# Patient Record
Sex: FEMALE | Race: OTHER | HISPANIC OR LATINO | Employment: FULL TIME | URBAN - METROPOLITAN AREA
[De-identification: names, ages, dates, MRNs, and addresses within clinical notes are randomized per-mention and may not be internally consistent; named-entity substitution may affect disease eponyms.]

---
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
## 2018-01-10 NOTE — MISCELLANEOUS
Provider Comments  Provider Comments:   PN PT WAS A NO SHOW AGAIN TODAY I CALLED AND LEFT MSG FOR PT TO CALL AND RESCHEDULE APPT      Signatures   Electronically signed by : Regina Correa, ; Mar 10 2016  9:30AM EST                       (Author)    Electronically signed by :  YANIRA Lawler; Mar 21 2016 11:16AM EST                       (Acknowledgement)

## 2018-01-10 NOTE — MISCELLANEOUS
Provider Comments  Provider Comments:   PT NO SHOW FOR PN TRY CALLING PATIENT TO R/S NOT ACCEPTING CALLS GOING TO SEND LETTER      Signatures   Electronically signed by : Corinda Fothergill, ; Mar  9 2016 11:15AM EST                       (Author)    Electronically signed by :  YANIRA Siddiqui; Mar  9 2016  2:44PM EST                       (Acknowledgement)    Electronically signed by : Nichole Kinney MD; Mar 10 2016  9:11AM EST

## 2018-01-14 NOTE — MISCELLANEOUS
Reason For Visit  Reason For Visit Free Text Note Form: Teenage pregnancy  Case Management Documentation [facility]:   Information obtained from the patient  She is also dealing with additional issues such as lack of support and family crisis  Patient is participating in [facility]  Action Plan: supportive counseling/advocacy and information provided  plan reviewed  Progress Note  Met with pt  with her friend present at pts  request  Pt  admits she is pregnant  She is feeling excited and scared at the same time  Pt  reports this was planned by FOB however he has changed his mind and does not want to be involved  Pt  reports residing with her mother  She is attending high school and plans to graduate in June 2016  Pt  reports she is afraid to tell her mother she is pregnant  Supportive counseling provided  Offered pt  outpatient mental health counseling option however she declines  Reminded pt  that school may provide counseling services and encouraged her to pursue this as needed  Pt  reports she has the support of her friends at this time  She denies further needs  Encouraged pt  to contact SW at [facility] as needed for support and assistance  She verbalizes understanding and agreement of information  Will continue to be available to provide support  Active Problems    1  Cough (786 2) (R05)   2  Other normal pregnancy, not first (V22 1) (Z34 80)   3  Pharyngitis (462) (J02 9)   4  Skin growth (239 2) (D49 2)    Current Meds   1  No Reported Medications Recorded    Allergies    1  No Known Drug Allergies    2   Seasonal    Signatures   Electronically signed by : LUIS ALBERTO Lee; Jan 21 2016  2:06PM EST                       (Author)

## 2018-02-16 ENCOUNTER — HOSPITAL ENCOUNTER (EMERGENCY)
Facility: HOSPITAL | Age: 18
Discharge: HOME | End: 2018-02-16
Attending: EMERGENCY MEDICINE | Admitting: EMERGENCY MEDICINE
Payer: COMMERCIAL

## 2018-02-16 VITALS
BODY MASS INDEX: 28.34 KG/M2 | OXYGEN SATURATION: 99 % | TEMPERATURE: 98.7 F | HEART RATE: 98 BPM | SYSTOLIC BLOOD PRESSURE: 104 MMHG | DIASTOLIC BLOOD PRESSURE: 55 MMHG | RESPIRATION RATE: 18 BRPM | WEIGHT: 150 LBS

## 2018-02-16 DIAGNOSIS — J06.9 UPPER RESPIRATORY INFECTION: Primary | ICD-10-CM

## 2018-02-16 DIAGNOSIS — Z3A.24 24 WEEKS GESTATION OF PREGNANCY: ICD-10-CM

## 2018-02-16 PROCEDURE — 87798 DETECT AGENT NOS DNA AMP: CPT | Performed by: EMERGENCY MEDICINE

## 2018-02-16 PROCEDURE — 99283 EMERGENCY DEPT VISIT LOW MDM: CPT

## 2018-02-17 LAB
FLUAV AG SPEC QL: DETECTED
FLUBV AG SPEC QL: ABNORMAL
RSV B RNA SPEC QL NAA+PROBE: ABNORMAL

## 2018-02-17 NOTE — ED PROVIDER NOTES
History  Chief Complaint   Patient presents with    URI     Pt  is co sore throat, hot and cold flashes and headaches that started this morning  Her boyfriend was recently diagnosed with the flu and pt  is 24 weeks pregnant and is concerned about having the flu because of her pregnancy  78-year-old female currently 24 weeks pregnant presents to the emergency room with sore throat, cough, and congestion x1 day  Patient reports that her boyfriend was diagnosed with the flu 2 days ago  She states that she was given Tamiflu q day times 10 days for prophylaxis  She denies any fevers, nausea/vomiting, chest pain, shortness of breath, abdominal pain, or urinary symptoms  She reports that her pregnancy has been uncomplicated thus far and denies any abdominal pain or cramping  She also states that she has been feeling normal fetal movements  She denies any vaginal bleeding or discharge  No other complaints  History provided by:  Patient  Cough   Cough characteristics:  Non-productive  Severity:  Moderate  Onset quality:  Sudden  Duration:  1 day  Timing:  Constant  Progression:  Worsening  Chronicity:  New  Smoker: no    Context: sick contacts    Relieved by:  None tried  Worsened by:  Nothing  Ineffective treatments:  None tried  Associated symptoms: sore throat    Associated symptoms: no chest pain, no chills, no ear pain, no fever, no headaches, no rash, no rhinorrhea, no shortness of breath and no wheezing    Sore throat:     Duration:  1 day    Timing:  Constant    Progression:  Worsening      Prior to Admission Medications   Prescriptions Last Dose Informant Patient Reported? Taking?    Prenatal Vit-Fe Fumarate-FA (PRENATAL 1 PLUS 1 PO)   Yes Yes   Sig: Take 1 tablet by mouth daily      Facility-Administered Medications: None       Past Medical History:   Diagnosis Date    Asthma     Thrombocytosis ([facility] [address] )        Past Surgical History:   Procedure Laterality Date    APPENDECTOMY         History reviewed  No pertinent family history  I have reviewed and agree with the history as documented  Social History   Substance Use Topics    Smoking status: Never Smoker    Smokeless tobacco: Never Used    Alcohol use No        Review of Systems   Constitutional: Negative for chills and fever  HENT: Positive for sore throat  Negative for congestion, ear pain and rhinorrhea  Eyes: Negative for pain and visual disturbance  Respiratory: Positive for cough  Negative for shortness of breath and wheezing  Cardiovascular: Negative for chest pain and leg swelling  Gastrointestinal: Negative for abdominal pain, diarrhea, nausea and vomiting  Genitourinary: Negative for dysuria, frequency, hematuria and urgency  Musculoskeletal: Negative for neck pain and neck stiffness  Skin: Negative for rash and wound  Neurological: Negative for weakness, numbness and headaches  Psychiatric/Behavioral: Negative for agitation and confusion  All other systems reviewed and are negative  Physical Exam  ED Triage Vitals [02/16/18 1926]   Temperature Pulse Respirations Blood Pressure SpO2   98 7 °F (37 1 °C) 98 18 104/55 99 %      Temp Source Heart Rate Source Patient Position - Orthostatic VS BP Location FiO2 (%)   Oral Monitor Sitting Left arm --      Pain Score       8           Orthostatic Vital Signs  Vitals:    02/16/18 1926   BP: 104/55   Pulse: 98   Patient Position - Orthostatic VS: Sitting       Physical Exam   Constitutional: She is oriented to person, place, and time  She appears well-developed and well-nourished  HENT:   Head: Normocephalic and atraumatic  Nose: Mucosal edema present  Mouth/Throat: Uvula is midline and mucous membranes are normal  Posterior oropharyngeal erythema present  No oropharyngeal exudate  Eyes: EOM are normal  Pupils are equal, round, and reactive to light  Neck: Normal range of motion  Neck supple  Cardiovascular: Normal rate and regular rhythm  Pulmonary/Chest: Effort normal and breath sounds normal    Abdominal: Soft  Bowel sounds are normal  She exhibits no distension  There is no tenderness  Gravid uterus around 24 weeks   Musculoskeletal: Normal range of motion  Neurological: She is alert and oriented to person, place, and time  No focal deficits   Skin: Skin is warm and dry  Nursing note and vitals reviewed  ED Medications  Medications - No data to display    Diagnostic Studies  Results Reviewed     Procedure Component Value Units Date/Time    Influenza A/B and RSV by PCR (indicated for patients >2 mo of age) [[id number]] Collected:  02/16/18 2150    Lab Status: In process Specimen:  Nasopharyngeal from Nasopharyngeal Swab Updated:  02/16/18 2155                 No orders to display         Procedures  Procedures      Phone Consults  ED Phone Contact    ED Course  ED Course                                MDM  Number of Diagnoses or Management Options  24 weeks gestation of pregnancy: new and requires workup  Upper respiratory infection: new and requires workup  Diagnosis management comments: Patient with sore throat, cough, and congestion likely secondary to viral URI  Will get flu swab to rule out flu  Plan is for supportive care including Tylenol, fluids, and rest   Patient instructed to continue Tamiflu prophylaxis and if she is called that she is positive for the flu then she is to increase the dose to twice a day for 5 days  Patient reevaluated and feels improved  Discharge instructions given including follow-up, and return precautions  Patient demonstrates verbal understanding and agrees with plan         Amount and/or Complexity of Data Reviewed  Clinical lab tests: ordered and reviewed  Tests in the radiology section of CPT®: ordered and reviewed  Tests in the medicine section of CPT®: ordered and reviewed  Discussion of test results with the performing providers: yes  Decide to obtain previous medical records or to obtain history from someone other than the patient: yes  Obtain history from someone other than the patient: yes  Review and summarize past medical records: yes  Discuss the patient with other providers: yes  Independent visualization of images, tracings, or specimens: yes    Patient Progress  Patient progress: improved    CritCare Time    Disposition  Final diagnoses:   Upper respiratory infection   24 weeks gestation of pregnancy     Time reflects when diagnosis was documented in both MDM as applicable and the Disposition within this note     Time User Action Codes Description Comment    2/16/2018  9:45 PM Reina Man Add [J06 9] Upper respiratory infection     2/16/2018  9:46 PM Reina Man Add [Z3A 24] 24 weeks gestation of pregnancy       ED Disposition     ED Disposition Condition Comment    Discharge  Perez Fleming discharge to home/self care  Condition at discharge: Good        Follow-up Information     Follow up With Specialties Details Why Contact Info Additional Information    Your OBGYN  Call in 1 day For follow-up within 2-3 days  [address] [place] [address] Emergency Department Emergency Medicine Go to Immediately for any new or worsening symptoms  [address] [place] [address]  [phone]  ED, [address] [place] [address], [id number]        Discharge Medication List as of 2/16/2018  9:47 PM      CONTINUE these medications which have NOT CHANGED    Details   Prenatal Vit-Fe Fumarate-FA (PRENATAL 1 PLUS 1 PO) Take 1 tablet by mouth daily, Historical Med           No discharge procedures on file  ED Provider  Attending physically available and evaluated Perez Fleming I managed the patient along with the ED Attending      Electronically Signed by         Beth Schuler DO  02/16/18 8761

## 2018-02-17 NOTE — DISCHARGE INSTRUCTIONS
Upper Respiratory Infection   WHAT YOU NEED TO KNOW:   An upper respiratory infection is also called the common cold  It is an infection that can affect your nose, throat, ears, and sinuses  For healthy people, the common cold is usually not serious and does not need special treatment  Cold symptoms are usually worst for the first 3 to 5 days  Most people get better in 7 to 14 days  You may continue to cough for 2 to 3 weeks  Colds are caused by viruses and do not get better with antibiotics  DISCHARGE INSTRUCTIONS:   Return to the emergency department if:   · You have chest pain or trouble breathing  Contact your healthcare provider if:   · You have a fever over 102ºF (39°C)  · Your sore throat gets worse or you see white or yellow spots in your throat  · Your symptoms get worse after 3 to 5 days or your cold is not better in 14 days  · You have a rash anywhere on your skin  · You have large, tender lumps in your neck  · You have thick, green or yellow drainage from your nose  · You cough up thick yellow, green, or bloody mucus  · You have vomiting for more than 24 hours and cannot keep fluids down  · You have a bad earache  · You have questions or concerns about your condition or care  Medicines: You may need any of the following:  · Decongestants  help reduce nasal congestion and help you breathe more easily  If you take decongestant pills, they may make you feel restless or cause problems with your sleep  Do not use decongestant sprays for more than a few days  · Cough suppressants  help reduce coughing  Ask your healthcare provider which type of cough medicine is best for you  · NSAIDs , such as ibuprofen, help decrease swelling, pain, and fever  NSAIDs can cause stomach bleeding or kidney problems in certain people  If you take blood thinner medicine, always ask your healthcare provider if NSAIDs are safe for you   Always read the medicine label and follow directions  · Acetaminophen  decreases pain and fever  It is available without a doctor's order  Ask how much to take and how often to take it  Follow directions  Read the labels of all other medicines you are using to see if they also contain acetaminophen, or ask your doctor or pharmacist  Acetaminophen can cause liver damage if not taken correctly  Do not use more than 4 grams (4,000 milligrams) total of acetaminophen in one day  · Take your medicine as directed  Contact your healthcare provider if you think your medicine is not helping or if you have side effects  Tell him or her if you are allergic to any medicine  Keep a list of the medicines, vitamins, and herbs you take  Include the amounts, and when and why you take them  Bring the list or the pill bottles to follow-up visits  Carry your medicine list with you in case of an emergency  Follow up with your healthcare provider as directed:  Write down your questions so you remember to ask them during your visits  Self-care:   · Rest as much as possible  Slowly start to do more each day  · Drink more liquids as directed  Liquids will help thin and loosen mucus so you can cough it up  Liquids will also help prevent dehydration  Liquids that help prevent dehydration include water, fruit juice, and broth  Do not drink liquids that contain caffeine  Caffeine can increase your risk for dehydration  Ask your healthcare provider how much liquid to drink each day  · Soothe a sore throat  Gargle with warm salt water  This helps your sore throat feel better  Make salt water by dissolving ¼ teaspoon salt in 1 cup warm water  You may also suck on hard candy or throat lozenges  You may use a sore throat spray  · Use a humidifier or vaporizer  Use a cool mist humidifier or a vaporizer to increase air moisture in your home  This may make it easier for you to breathe and help decrease your cough  · Use saline nasal drops as directed    These help relieve congestion  · Apply petroleum-based jelly around the outside of your nostrils  This can decrease irritation from blowing your nose  · Do not smoke  Nicotine and other chemicals in cigarettes and cigars can make your symptoms worse  They can also cause infections such as bronchitis or pneumonia  Ask your healthcare provider for information if you currently smoke and need help to quit  E-cigarettes or smokeless tobacco still contain nicotine  Talk to your healthcare provider before you use these products  Prevent spreading your cold to others:   · Try to stay away from other people during the first 2 to 3 days of your cold when it is more easily spread  · Do not share food or drinks  · Do not share hand towels with household members  · Wash your hands often, especially after you blow your nose  Turn away from other people and cover your mouth and nose with a tissue when you sneeze or cough  © 2017 [address] [employer]  Information is for End User's use only and may not be sold, redistributed or otherwise used for commercial purposes  All illustrations and images included in CareNotes® are the copyrighted property of [employer]  or [employer]ack Mcneal  The above information is an  only  It is not intended as medical advice for individual conditions or treatments  Talk to your doctor, nurse or pharmacist before following any medical regimen to see if it is safe and effective for you  Pregnancy at 23 to 26 [address]:   You are now close to or at the beginning of the third trimester  The third trimester starts at 24 weeks and ends with delivery  As your baby gets larger, you may develop certain symptoms  These may include pain in your back or down the sides of your abdomen  You may also have stretch marks on your abdomen, breasts, thighs, or buttocks  You may also have constipation    DISCHARGE INSTRUCTIONS:   Return to the emergency department if:   · You develop a severe headache that does not go away  · You have new or increased vision changes, such as blurred or spotted vision  · You have new or increased swelling in your face or hands  · You have vaginal spotting or bleeding  · Your water broke or you feel warm water gushing or trickling from your vagina  Contact your healthcare provider if:   · You have abdominal cramps, pressure, or tightening  · You have a change in vaginal discharge  · You have light bleeding  · You have chills or a fever  · You have vaginal itching, burning, or pain  · You have yellow, green, white, or foul-smelling vaginal discharge  · You have pain or burning when you urinate, less urine than usual, or pink or bloody urine  · You have questions or concerns about your condition or care  How to care for yourself at this stage of your pregnancy:   · Eat a variety of healthy foods  Healthy foods include fruits, vegetables, whole-grain breads, low-fat dairy foods, beans, lean meats, and fish  Drink liquids as directed  Ask how much liquid to drink each day and which liquids are best for you  Limit caffeine to less than 200 milligrams each day  Limit your intake of fish to 2 servings each week  Choose fish low in mercury such as canned light tuna, shrimp, salmon, cod, or tilapia  Do not  eat fish high in mercury such as swordfish, tilefish, momo mackerel, and shark  · Manage back pain  Do not stand for long periods of time or lift heavy items  Use good posture while you stand, squat, or bend  Wear low-heeled shoes with good support  Rest may also help to relieve back pain  Ask your healthcare provider about exercises you can do to strengthen your back muscles  · Take prenatal vitamins as directed  Your need for certain vitamins and minerals, such as folic acid, increases during pregnancy  Prenatal vitamins provide some of the extra vitamins and minerals you need   Prenatal vitamins may also help to decrease the risk of certain birth defects  · Talk to your healthcare provider about exercise  Moderate exercise can help you stay fit  Your healthcare provider will help you plan an exercise program that is safe for you during pregnancy  · Do not smoke  If you smoke, it is never too late to quit  Smoking increases your risk of a miscarriage and other health problems during your pregnancy  Smoking can cause your baby to be born too early or weigh less at birth  Ask your healthcare provider for information if you need help quitting  · Do not drink alcohol  Alcohol passes from your body to your baby through the placenta  It can affect your baby's brain development and cause fetal alcohol syndrome (FAS)  FAS is a group of conditions that causes mental, behavior, and growth problems  · Talk to your healthcare provider before you take any medicines  Many medicines may harm your baby if you take them when you are pregnant  Do not take any medicines, vitamins, herbs, or supplements without first talking to your healthcare provider  Never use illegal or street drugs (such as marijuana or cocaine) while you are pregnant  Safety tips:   · Avoid hot tubs and saunas  Do not use a hot tub or sauna while you are pregnant, especially during your first trimester  Hot tubs and saunas may raise your baby's temperature and increase the risk of birth defects  · Avoid toxoplasmosis  This is an infection caused by eating raw meat or being around infected cat feces  It can cause birth defects, miscarriages, and other problems  Wash your hands after you touch raw meat  Make sure any meat is well-cooked before you eat it  Avoid raw eggs and unpasteurized milk  Use gloves or ask someone else to clean your cat's litter box while you are pregnant  Changes that are happening with your baby:  By 26 weeks, your baby will weigh about 2 pounds   Your baby will be about 10 inches long from the top of the head to the rump (baby's bottom)  Your baby's movements are much stronger now  Your baby's eyes are almost completely formed and can partially open  Your baby also sleeps and wakes up  What you need to know about prenatal care: Your healthcare provider will check your blood pressure and weight  You may also need the following:  · A urine test  may also be done to check for sugar and protein  These can be signs of gestational diabetes or infection  Protein in your urine may also be a sign of preeclampsia  Preeclampsia is a condition that can develop during week 20 or later of your pregnancy  It causes high blood pressure, and it can cause problems with your kidneys and other organs  · Fundal height  is a measurement of your uterus to check your baby's growth  This number is usually the same as the number of weeks that you have been pregnant  · Your baby's heart rate  will be checked  © 2017 [address] [employer] Information is for End User's use only and may not be sold, redistributed or otherwise used for commercial purposes  All illustrations and images included in CareNotes® are the copyrighted property of A D A M [employer]  or Zack Mcneal  The above information is an  only  It is not intended as medical advice for individual conditions or treatments  Talk to your doctor, nurse or pharmacist before following any medical regimen to see if it is safe and effective for you

## 2018-02-18 NOTE — PROGRESS NOTES
Patient called ED for a positive flu she was informed of yesterday when she called  She was previously given tamiflu for exposure prophyllaxis  However, since her visit she has been taking the therapeutic dose of 75mg BID  I instructed her to complete the 75mg BID for 5 days

## 2018-02-27 NOTE — ED ATTENDING ATTESTATION
Lieutenant Kiesha MD, saw and evaluated the patient  I have discussed the patient with the resident/non-physician practitioner and agree with the resident's/non-physician practitioner's findings, Plan of Care, and MDM as documented in the resident's/non-physician practitioner's note, except where noted  All available labs and Radiology studies were reviewed  At this point I agree with the current assessment done in the Emergency Department  I have conducted an independent evaluation of this patient a history and physical is as follows:    Patient presents with 1 day of cough, sore throat, and congestion  Reports that her boyfriend was dx with influenza and patient was started on prophylactic Tamiflu  Patient is 24 weeks pregnant and concerned robbie that may have the flu  No concerns with the pregnancy  Exam: AAOx3, NAD, RRR, CTA, S/NT/ND  A/P: Viral URI  Will check flu to adjust dosing of tamiflu      Critical Care Time  CritCare Time    Procedures

## 2019-12-30 ENCOUNTER — HOSPITAL ENCOUNTER (EMERGENCY)
Facility: HOSPITAL | Age: 19
Discharge: HOME | End: 2019-12-30
Attending: EMERGENCY MEDICINE | Admitting: EMERGENCY MEDICINE
Payer: COMMERCIAL

## 2019-12-30 VITALS
SYSTOLIC BLOOD PRESSURE: 108 MMHG | OXYGEN SATURATION: 100 % | DIASTOLIC BLOOD PRESSURE: 65 MMHG | HEART RATE: 113 BPM | BODY MASS INDEX: 23.24 KG/M2 | TEMPERATURE: 98.3 F | RESPIRATION RATE: 16 BRPM | WEIGHT: 123 LBS

## 2019-12-30 DIAGNOSIS — B34.9 VIRAL SYNDROME: Primary | ICD-10-CM

## 2019-12-30 LAB
EXT PREG TEST URINE: NEGATIVE
EXT. CONTROL ED NAV: NORMAL
FLUAV RNA NPH QL NAA+PROBE: NORMAL
FLUBV RNA NPH QL NAA+PROBE: NORMAL
RSV RNA NPH QL NAA+PROBE: NORMAL

## 2019-12-30 PROCEDURE — 99284 EMERGENCY DEPT VISIT MOD MDM: CPT

## 2019-12-30 PROCEDURE — 87631 RESP VIRUS 3-5 TARGETS: CPT | Performed by: EMERGENCY MEDICINE

## 2019-12-30 PROCEDURE — 81025 URINE PREGNANCY TEST: CPT | Performed by: EMERGENCY MEDICINE

## 2019-12-30 PROCEDURE — 96372 THER/PROPH/DIAG INJ SC/IM: CPT

## 2019-12-30 PROCEDURE — 99284 EMERGENCY DEPT VISIT MOD MDM: CPT | Performed by: EMERGENCY MEDICINE

## 2019-12-30 RX ORDER — ONDANSETRON 4 MG/1
4 TABLET, ORALLY DISINTEGRATING ORAL EVERY 6 HOURS PRN
Qty: 20 TABLET | Refills: 0 | Status: ON HOLD | OUTPATIENT
Start: 2019-12-30 | End: 2020-01-04 | Stop reason: ALTCHOICE

## 2019-12-30 RX ORDER — KETOROLAC TROMETHAMINE 30 MG/ML
30 INJECTION, SOLUTION INTRAMUSCULAR; INTRAVENOUS ONCE
Status: COMPLETED | OUTPATIENT
Start: 2019-12-30 | End: 2019-12-30

## 2019-12-30 RX ADMIN — KETOROLAC TROMETHAMINE 30 MG: 30 INJECTION, SOLUTION INTRAMUSCULAR at 19:47

## 2019-12-31 NOTE — ED PROVIDER NOTES
History  Chief Complaint   Patient presents with    Jaw Pain     pt states her lower jaw is swollen  pt reports waking up at night with sweats  pt states she has generalized body aches  tylenol taken early this morning     Patient is a 70-year-old female complaining of a 2 day history of swollen glands body aches nausea as well as subjective fever and chills  Patient not taking medication for symptoms  States she did get her flu shot this year  Denies any sick contacts  No vomiting no diarrhea  No difficulty swallowing  States was told approximately 1 year ago patient was post get her wisdom teeth removed  Did not follow up  Smokes intermittently  Patient was on antibiotics for approximately 4 days for pyelonephritis earlier this month but patient stops due to losing antibiotics  Not returned or follow-up to finish the prescription  Prior to Admission Medications   Prescriptions Last Dose Informant Patient Reported? Taking? Prenatal Vit-Fe Fumarate-FA (PRENATAL 1 PLUS 1 PO)   Yes No   Sig: Take 1 tablet by mouth daily      Facility-Administered Medications: None       Past Medical History:   Diagnosis Date    Asthma     Thrombocytosis ([facility] [address] )        Past Surgical History:   Procedure Laterality Date    APPENDECTOMY         History reviewed  No pertinent family history  I have reviewed and agree with the history as documented  Social History     Tobacco Use    Smoking status: Never Smoker    Smokeless tobacco: Never Used   Substance Use Topics    Alcohol use: No    Drug use: No        Review of Systems   Constitutional: Positive for appetite change, chills and fever  Negative for activity change  HENT: Positive for sore throat  Negative for tinnitus and trouble swallowing  Eyes: Negative  Respiratory: Negative  Negative for cough and choking  Cardiovascular: Negative  Negative for chest pain  Gastrointestinal: Positive for nausea  Negative for abdominal pain and vomiting  Endocrine: Negative  Genitourinary: Negative  Musculoskeletal: Positive for myalgias  Skin: Negative  Allergic/Immunologic: Negative  Neurological: Negative  Hematological: Negative  Psychiatric/Behavioral: Negative  All other systems reviewed and are negative  Physical Exam  Physical Exam   Constitutional: She is oriented to person, place, and time  She appears well-developed and well-nourished  HENT:   Head: Normocephalic and atraumatic  Right Ear: External ear normal    Left Ear: External ear normal    Nose: Mucosal edema and rhinorrhea present  Mouth/Throat: Uvula is midline, oropharynx is clear and moist and mucous membranes are normal  No oropharyngeal exudate, posterior oropharyngeal edema or posterior oropharyngeal erythema  Neck: Normal range of motion  Neck supple  No tracheal deviation present  No thyromegaly present  No nuchal rigidity   Cardiovascular: Normal rate, regular rhythm, normal heart sounds and intact distal pulses  Pulmonary/Chest: Effort normal and breath sounds normal    Abdominal: Soft  Bowel sounds are normal  She exhibits no distension  There is no tenderness  There is no guarding  Musculoskeletal: Normal range of motion  Lymphadenopathy:     She has cervical adenopathy  Neurological: She is alert and oriented to person, place, and time  Skin: Skin is warm and dry  Capillary refill takes less than 2 seconds  Psychiatric: She has a normal mood and affect  Her behavior is normal  Thought content normal    Nursing note and vitals reviewed        Vital Signs  ED Triage Vitals   Temperature Pulse Respirations Blood Pressure SpO2   12/30/19 1922 12/30/19 1922 12/30/19 1922 12/30/19 1922 12/30/19 1922   98 3 °F (36 8 °C) 105 14 119/72 100 %      Temp Source Heart Rate Source Patient Position - Orthostatic VS BP Location FiO2 (%)   12/30/19 1922 12/30/19 1922 12/30/19 1922 12/30/19 1922 --   Tympanic Monitor Sitting Left arm       Pain Score 12/30/19 1930       Worst Possible Pain           Vitals:    12/30/19 1922   BP: 119/72   Pulse: 105   Patient Position - Orthostatic VS: Sitting         Visual Acuity      ED Medications  Medications   ketorolac (TORADOL) injection 30 mg (30 mg Intramuscular Given 12/30/19 1947)       Diagnostic Studies  Results Reviewed     Procedure Component Value Units Date/Time    Influenza A/B and RSV PCR [[id number]] Collected:  12/30/19 1944    Lab Status: In process Specimen:  Nares from Nose Updated:  12/30/19 1947    POCT pregnancy, urine [[id number]]  (Normal) Resulted:  12/30/19 1944    Lab Status:  Final result Updated:  12/30/19 1944     EXT PREG TEST UR (Ref: Negative) NEGATIVE     Control valid                 No orders to display              Procedures  Procedures         ED Course  ED Course as of Dec 30 2029   Mon Dec 30, 2019   2029 Went over results with patient  Will dc with symptomatic care  Follow up with PCP, return for any concern  MDM      Disposition  Final diagnoses:   None     ED Disposition     None      Follow-up Information    None         Patient's Medications   Discharge Prescriptions    No medications on file     No discharge procedures on file      ED Provider  Electronically Signed by           Kari Sage MD  12/31/19 4961

## 2020-01-03 ENCOUNTER — HOSPITAL ENCOUNTER (INPATIENT)
Facility: HOSPITAL | Age: 20
LOS: 7 days | Discharge: HOME | DRG: 723 | End: 2020-01-11
Attending: EMERGENCY MEDICINE | Admitting: INTERNAL MEDICINE
Payer: COMMERCIAL

## 2020-01-03 ENCOUNTER — APPOINTMENT (EMERGENCY)
Dept: RADIOLOGY | Facility: HOSPITAL | Age: 20
DRG: 723 | End: 2020-01-03
Payer: COMMERCIAL

## 2020-01-03 DIAGNOSIS — R07.89 OTHER CHEST PAIN: Primary | ICD-10-CM

## 2020-01-03 DIAGNOSIS — M54.2 NECK PAIN: ICD-10-CM

## 2020-01-03 DIAGNOSIS — R59.0 LAD (LYMPHADENOPATHY), SUBMENTAL: ICD-10-CM

## 2020-01-03 DIAGNOSIS — R07.9 CHEST PAIN, UNSPECIFIED TYPE: ICD-10-CM

## 2020-01-03 DIAGNOSIS — R77.8 ELEVATED TROPONIN: ICD-10-CM

## 2020-01-03 DIAGNOSIS — R59.1 LYMPHADENOPATHY: ICD-10-CM

## 2020-01-03 DIAGNOSIS — N89.8 VAGINAL DISCHARGE: ICD-10-CM

## 2020-01-03 LAB
ANION GAP SERPL CALCULATED.3IONS-SCNC: 6 MMOL/L (ref 4–13)
BASOPHILS # BLD AUTO: 0.06 THOUSANDS/ΜL (ref 0–0.1)
BASOPHILS NFR BLD AUTO: 1 % (ref 0–1)
BUN SERPL-MCNC: 7 MG/DL (ref 5–25)
CALCIUM SERPL-MCNC: 8.9 MG/DL (ref 8.3–10.1)
CHLORIDE SERPL-SCNC: 104 MMOL/L (ref 100–108)
CO2 SERPL-SCNC: 28 MMOL/L (ref 21–32)
CREAT SERPL-MCNC: 0.72 MG/DL (ref 0.6–1.3)
EOSINOPHIL # BLD AUTO: 0.05 THOUSAND/ΜL (ref 0–0.61)
EOSINOPHIL NFR BLD AUTO: 1 % (ref 0–6)
ERYTHROCYTE [DISTWIDTH] IN BLOOD BY AUTOMATED COUNT: 14.5 % (ref 11.6–15.1)
GFR SERPL CREATININE-BSD FRML MDRD: 120 ML/MIN/1.73SQ M
GLUCOSE SERPL-MCNC: 103 MG/DL (ref 65–140)
HCT VFR BLD AUTO: 38 % (ref 34.8–46.1)
HGB BLD-MCNC: 12.3 G/DL (ref 11.5–15.4)
IMM GRANULOCYTES # BLD AUTO: 0.03 THOUSAND/UL (ref 0–0.2)
IMM GRANULOCYTES NFR BLD AUTO: 0 % (ref 0–2)
LYMPHOCYTES # BLD AUTO: 2.74 THOUSANDS/ΜL (ref 0.6–4.47)
LYMPHOCYTES NFR BLD AUTO: 27 % (ref 14–44)
MCH RBC QN AUTO: 27.3 PG (ref 26.8–34.3)
MCHC RBC AUTO-ENTMCNC: 32.4 G/DL (ref 31.4–37.4)
MCV RBC AUTO: 84 FL (ref 82–98)
MONOCYTES # BLD AUTO: 0.89 THOUSAND/ΜL (ref 0.17–1.22)
MONOCYTES NFR BLD AUTO: 9 % (ref 4–12)
NEUTROPHILS # BLD AUTO: 6.29 THOUSANDS/ΜL (ref 1.85–7.62)
NEUTS SEG NFR BLD AUTO: 62 % (ref 43–75)
NRBC BLD AUTO-RTO: 0 /100 WBCS
PLATELET # BLD AUTO: 430 THOUSANDS/UL (ref 149–390)
PMV BLD AUTO: 8.9 FL (ref 8.9–12.7)
POTASSIUM SERPL-SCNC: 3.5 MMOL/L (ref 3.5–5.3)
RBC # BLD AUTO: 4.51 MILLION/UL (ref 3.81–5.12)
SODIUM SERPL-SCNC: 138 MMOL/L (ref 136–145)
TROPONIN I SERPL-MCNC: 0.38 NG/ML
WBC # BLD AUTO: 10.06 THOUSAND/UL (ref 4.31–10.16)

## 2020-01-03 PROCEDURE — 87651 STREP A DNA AMP PROBE: CPT | Performed by: EMERGENCY MEDICINE

## 2020-01-03 PROCEDURE — 71046 X-RAY EXAM CHEST 2 VIEWS: CPT

## 2020-01-03 PROCEDURE — 85025 COMPLETE CBC W/AUTO DIFF WBC: CPT | Performed by: EMERGENCY MEDICINE

## 2020-01-03 PROCEDURE — 36415 COLL VENOUS BLD VENIPUNCTURE: CPT | Performed by: EMERGENCY MEDICINE

## 2020-01-03 PROCEDURE — 80048 BASIC METABOLIC PNL TOTAL CA: CPT | Performed by: EMERGENCY MEDICINE

## 2020-01-03 PROCEDURE — 84484 ASSAY OF TROPONIN QUANT: CPT | Performed by: EMERGENCY MEDICINE

## 2020-01-03 PROCEDURE — 93005 ELECTROCARDIOGRAM TRACING: CPT

## 2020-01-03 PROCEDURE — 99285 EMERGENCY DEPT VISIT HI MDM: CPT

## 2020-01-03 PROCEDURE — 96360 HYDRATION IV INFUSION INIT: CPT

## 2020-01-03 PROCEDURE — 99285 EMERGENCY DEPT VISIT HI MDM: CPT | Performed by: EMERGENCY MEDICINE

## 2020-01-03 RX ADMIN — SODIUM CHLORIDE 1000 ML: 0.9 INJECTION, SOLUTION INTRAVENOUS at 23:24

## 2020-01-04 ENCOUNTER — APPOINTMENT (OUTPATIENT)
Dept: NON INVASIVE DIAGNOSTICS | Facility: HOSPITAL | Age: 20
DRG: 723 | End: 2020-01-04
Payer: COMMERCIAL

## 2020-01-04 ENCOUNTER — APPOINTMENT (EMERGENCY)
Dept: RADIOLOGY | Facility: HOSPITAL | Age: 20
DRG: 723 | End: 2020-01-04
Payer: COMMERCIAL

## 2020-01-04 PROBLEM — M54.2 NECK PAIN: Status: ACTIVE | Noted: 2020-01-04

## 2020-01-04 PROBLEM — R59.0: Status: ACTIVE | Noted: 2020-01-04

## 2020-01-04 PROBLEM — R65.10 SIRS (SYSTEMIC INFLAMMATORY RESPONSE SYNDROME) (HCC): Status: ACTIVE | Noted: 2020-01-04

## 2020-01-04 PROBLEM — R77.8 ELEVATED TROPONIN: Status: ACTIVE | Noted: 2020-01-04

## 2020-01-04 PROBLEM — R07.9 CHEST PAIN: Status: ACTIVE | Noted: 2020-01-04

## 2020-01-04 LAB
ANION GAP SERPL CALCULATED.3IONS-SCNC: 3 MMOL/L (ref 4–13)
ATRIAL RATE: 100 BPM
BACTERIA UR QL AUTO: ABNORMAL /HPF
BASOPHILS # BLD AUTO: 0.07 THOUSANDS/ΜL (ref 0–0.1)
BASOPHILS NFR BLD AUTO: 1 % (ref 0–1)
BILIRUB UR QL STRIP: NEGATIVE
BUN SERPL-MCNC: 4 MG/DL (ref 5–25)
CALCIUM SERPL-MCNC: 8.8 MG/DL (ref 8.3–10.1)
CHLORIDE SERPL-SCNC: 107 MMOL/L (ref 100–108)
CLARITY UR: CLEAR
CO2 SERPL-SCNC: 27 MMOL/L (ref 21–32)
COLOR UR: YELLOW
CREAT SERPL-MCNC: 0.67 MG/DL (ref 0.6–1.3)
CRP SERPL QL: >90 MG/L
EOSINOPHIL # BLD AUTO: 0.1 THOUSAND/ΜL (ref 0–0.61)
EOSINOPHIL NFR BLD AUTO: 1 % (ref 0–6)
ERYTHROCYTE [DISTWIDTH] IN BLOOD BY AUTOMATED COUNT: 14.5 % (ref 11.6–15.1)
ERYTHROCYTE [SEDIMENTATION RATE] IN BLOOD: 71 MM/HOUR (ref 0–20)
EXT PREG TEST URINE: NEGATIVE
EXT. CONTROL ED NAV: NORMAL
GFR SERPL CREATININE-BSD FRML MDRD: 126 ML/MIN/1.73SQ M
GLUCOSE SERPL-MCNC: 103 MG/DL (ref 65–140)
GLUCOSE UR STRIP-MCNC: NEGATIVE MG/DL
HCT VFR BLD AUTO: 33.9 % (ref 34.8–46.1)
HGB BLD-MCNC: 10.5 G/DL (ref 11.5–15.4)
HGB UR QL STRIP.AUTO: NEGATIVE
HYALINE CASTS #/AREA URNS LPF: ABNORMAL /LPF
IMM GRANULOCYTES # BLD AUTO: 0.03 THOUSAND/UL (ref 0–0.2)
IMM GRANULOCYTES NFR BLD AUTO: 0 % (ref 0–2)
KETONES UR STRIP-MCNC: NEGATIVE MG/DL
LDH SERPL-CCNC: 220 U/L (ref 81–234)
LEUKOCYTE ESTERASE UR QL STRIP: NEGATIVE
LYMPHOCYTES # BLD AUTO: 3.68 THOUSANDS/ΜL (ref 0.6–4.47)
LYMPHOCYTES NFR BLD AUTO: 36 % (ref 14–44)
MAGNESIUM SERPL-MCNC: 2.4 MG/DL (ref 1.6–2.6)
MCH RBC QN AUTO: 26.5 PG (ref 26.8–34.3)
MCHC RBC AUTO-ENTMCNC: 31 G/DL (ref 31.4–37.4)
MCV RBC AUTO: 86 FL (ref 82–98)
MONOCYTES # BLD AUTO: 0.94 THOUSAND/ΜL (ref 0.17–1.22)
MONOCYTES NFR BLD AUTO: 9 % (ref 4–12)
NEUTROPHILS # BLD AUTO: 5.29 THOUSANDS/ΜL (ref 1.85–7.62)
NEUTS SEG NFR BLD AUTO: 53 % (ref 43–75)
NITRITE UR QL STRIP: NEGATIVE
NON-SQ EPI CELLS URNS QL MICRO: ABNORMAL /HPF
NRBC BLD AUTO-RTO: 0 /100 WBCS
P AXIS: 76 DEGREES
PH UR STRIP.AUTO: 7.5 [PH]
PHOSPHATE SERPL-MCNC: 3 MG/DL (ref 2.7–4.5)
PLATELET # BLD AUTO: 381 THOUSANDS/UL (ref 149–390)
PMV BLD AUTO: 9.1 FL (ref 8.9–12.7)
POTASSIUM SERPL-SCNC: 3.1 MMOL/L (ref 3.5–5.3)
PR INTERVAL: 148 MS
PROT UR STRIP-MCNC: ABNORMAL MG/DL
QRS AXIS: -14 DEGREES
QRSD INTERVAL: 90 MS
QT INTERVAL: 316 MS
QTC INTERVAL: 407 MS
RBC # BLD AUTO: 3.96 MILLION/UL (ref 3.81–5.12)
RBC #/AREA URNS AUTO: ABNORMAL /HPF
S PYO DNA THROAT QL NAA+PROBE: NORMAL
SODIUM SERPL-SCNC: 137 MMOL/L (ref 136–145)
SP GR UR STRIP.AUTO: 1.03 (ref 1–1.03)
T WAVE AXIS: 55 DEGREES
TROPONIN I SERPL-MCNC: 0.2 NG/ML
TROPONIN I SERPL-MCNC: 0.24 NG/ML
TROPONIN I SERPL-MCNC: 0.33 NG/ML
UROBILINOGEN UR QL STRIP.AUTO: 1 E.U./DL
VENTRICULAR RATE: 100 BPM
WBC # BLD AUTO: 10.11 THOUSAND/UL (ref 4.31–10.16)
WBC #/AREA URNS AUTO: ABNORMAL /HPF

## 2020-01-04 PROCEDURE — 84484 ASSAY OF TROPONIN QUANT: CPT | Performed by: PHYSICIAN ASSISTANT

## 2020-01-04 PROCEDURE — 84100 ASSAY OF PHOSPHORUS: CPT | Performed by: PHYSICIAN ASSISTANT

## 2020-01-04 PROCEDURE — 86308 HETEROPHILE ANTIBODY SCREEN: CPT | Performed by: INTERNAL MEDICINE

## 2020-01-04 PROCEDURE — 86140 C-REACTIVE PROTEIN: CPT | Performed by: PHYSICIAN ASSISTANT

## 2020-01-04 PROCEDURE — 86665 EPSTEIN-BARR CAPSID VCA: CPT | Performed by: PHYSICIAN ASSISTANT

## 2020-01-04 PROCEDURE — 87491 CHLMYD TRACH DNA AMP PROBE: CPT | Performed by: PHYSICIAN ASSISTANT

## 2020-01-04 PROCEDURE — 83615 LACTATE (LD) (LDH) ENZYME: CPT | Performed by: PHYSICIAN ASSISTANT

## 2020-01-04 PROCEDURE — 81001 URINALYSIS AUTO W/SCOPE: CPT | Performed by: EMERGENCY MEDICINE

## 2020-01-04 PROCEDURE — 99254 IP/OBS CNSLTJ NEW/EST MOD 60: CPT | Performed by: INTERNAL MEDICINE

## 2020-01-04 PROCEDURE — 86664 EPSTEIN-BARR NUCLEAR ANTIGEN: CPT | Performed by: PHYSICIAN ASSISTANT

## 2020-01-04 PROCEDURE — 81025 URINE PREGNANCY TEST: CPT | Performed by: EMERGENCY MEDICINE

## 2020-01-04 PROCEDURE — 87591 N.GONORRHOEAE DNA AMP PROB: CPT | Performed by: PHYSICIAN ASSISTANT

## 2020-01-04 PROCEDURE — 36415 COLL VENOUS BLD VENIPUNCTURE: CPT | Performed by: PHYSICIAN ASSISTANT

## 2020-01-04 PROCEDURE — 85652 RBC SED RATE AUTOMATED: CPT | Performed by: PHYSICIAN ASSISTANT

## 2020-01-04 PROCEDURE — 80048 BASIC METABOLIC PNL TOTAL CA: CPT | Performed by: PHYSICIAN ASSISTANT

## 2020-01-04 PROCEDURE — 93306 TTE W/DOPPLER COMPLETE: CPT | Performed by: INTERNAL MEDICINE

## 2020-01-04 PROCEDURE — 99219 PR INITIAL OBSERVATION CARE/DAY 50 MINUTES: CPT | Performed by: INTERNAL MEDICINE

## 2020-01-04 PROCEDURE — 87389 HIV-1 AG W/HIV-1&-2 AB AG IA: CPT | Performed by: INTERNAL MEDICINE

## 2020-01-04 PROCEDURE — 93306 TTE W/DOPPLER COMPLETE: CPT

## 2020-01-04 PROCEDURE — 86663 EPSTEIN-BARR ANTIBODY: CPT | Performed by: PHYSICIAN ASSISTANT

## 2020-01-04 PROCEDURE — 83735 ASSAY OF MAGNESIUM: CPT | Performed by: PHYSICIAN ASSISTANT

## 2020-01-04 PROCEDURE — 85025 COMPLETE CBC W/AUTO DIFF WBC: CPT | Performed by: PHYSICIAN ASSISTANT

## 2020-01-04 PROCEDURE — 93010 ELECTROCARDIOGRAM REPORT: CPT | Performed by: INTERNAL MEDICINE

## 2020-01-04 PROCEDURE — 70491 CT SOFT TISSUE NECK W/DYE: CPT

## 2020-01-04 RX ORDER — ACETAMINOPHEN 325 MG/1
650 TABLET ORAL EVERY 6 HOURS PRN
Status: DISCONTINUED | OUTPATIENT
Start: 2020-01-04 | End: 2020-01-11 | Stop reason: HOSPADM

## 2020-01-04 RX ORDER — IBUPROFEN 600 MG/1
600 TABLET ORAL EVERY 6 HOURS SCHEDULED
Status: DISCONTINUED | OUTPATIENT
Start: 2020-01-04 | End: 2020-01-05

## 2020-01-04 RX ORDER — COLCHICINE 0.6 MG/1
0.6 TABLET ORAL 2 TIMES DAILY
Status: DISCONTINUED | OUTPATIENT
Start: 2020-01-04 | End: 2020-01-08

## 2020-01-04 RX ORDER — ONDANSETRON 2 MG/ML
4 INJECTION INTRAMUSCULAR; INTRAVENOUS EVERY 6 HOURS PRN
Status: DISCONTINUED | OUTPATIENT
Start: 2020-01-04 | End: 2020-01-11 | Stop reason: HOSPADM

## 2020-01-04 RX ORDER — POLYETHYLENE GLYCOL 3350 17 G/17G
17 POWDER, FOR SOLUTION ORAL DAILY
Status: DISCONTINUED | OUTPATIENT
Start: 2020-01-04 | End: 2020-01-04

## 2020-01-04 RX ORDER — POLYETHYLENE GLYCOL 3350 17 G/17G
17 POWDER, FOR SOLUTION ORAL DAILY PRN
Status: DISCONTINUED | OUTPATIENT
Start: 2020-01-04 | End: 2020-01-06

## 2020-01-04 RX ORDER — ONDANSETRON 4 MG/1
4 TABLET, FILM COATED ORAL EVERY 6 HOURS PRN
COMMUNITY

## 2020-01-04 RX ORDER — SODIUM CHLORIDE, SODIUM LACTATE, POTASSIUM CHLORIDE, CALCIUM CHLORIDE 600; 310; 30; 20 MG/100ML; MG/100ML; MG/100ML; MG/100ML
50 INJECTION, SOLUTION INTRAVENOUS CONTINUOUS
Status: DISCONTINUED | OUTPATIENT
Start: 2020-01-04 | End: 2020-01-04

## 2020-01-04 RX ORDER — POTASSIUM CHLORIDE 20 MEQ/1
40 TABLET, EXTENDED RELEASE ORAL ONCE
Status: COMPLETED | OUTPATIENT
Start: 2020-01-04 | End: 2020-01-04

## 2020-01-04 RX ADMIN — IBUPROFEN 600 MG: 600 TABLET ORAL at 17:19

## 2020-01-04 RX ADMIN — COLCHICINE 0.6 MG: 0.6 TABLET, FILM COATED ORAL at 21:34

## 2020-01-04 RX ADMIN — POTASSIUM CHLORIDE 40 MEQ: 1500 TABLET, EXTENDED RELEASE ORAL at 08:57

## 2020-01-04 RX ADMIN — COLCHICINE 0.6 MG: 0.6 TABLET, FILM COATED ORAL at 13:44

## 2020-01-04 RX ADMIN — IBUPROFEN 600 MG: 600 TABLET ORAL at 23:57

## 2020-01-04 RX ADMIN — IBUPROFEN 600 MG: 600 TABLET ORAL at 12:39

## 2020-01-04 RX ADMIN — IOHEXOL 85 ML: 350 INJECTION, SOLUTION INTRAVENOUS at 00:19

## 2020-01-04 RX ADMIN — ACETAMINOPHEN 650 MG: 325 TABLET ORAL at 03:00

## 2020-01-04 RX ADMIN — ACETAMINOPHEN 650 MG: 325 TABLET ORAL at 08:57

## 2020-01-04 RX ADMIN — SODIUM CHLORIDE, SODIUM LACTATE, POTASSIUM CHLORIDE, AND CALCIUM CHLORIDE 50 ML/HR: .6; .31; .03; .02 INJECTION, SOLUTION INTRAVENOUS at 02:59

## 2020-01-04 NOTE — ED PROVIDER NOTES
History  Chief Complaint   Patient presents with    Neck Swelling     woke up last week with swollen neck, then started with pains and fever, neck pain is still continuing, having sharp pain in chest, pain in the back of neck, body is feeling super achey, also having sweats at night  21 yo history of asthma  1 week ago woke in AM with some minor swelling right submandibular glands  Started with fever chills and bodyaches and neck pains (shooting pains into neck that never go away)  Then seen at [address] [place] [address], they checked flu and pregnancy both of which were negative  They discharged with viral illness  Since then taking motrin without any improvement  Worsening in swelling, pain on swallowing  Has had 3x episodes of emesis  All she can tolerate is water  Chest pain started 2 days ago, Lasts seconds at a time  Sharp  No radiation  SOB at the time  No diaphoresis  Nonexertional, happens while I'm in the room  No cough  Prior to Admission Medications   Prescriptions Last Dose Informant Patient Reported? Taking?   ondansetron (ZOFRAN) 4 mg tablet   Yes Yes   Sig: Take 4 mg by mouth every 6 (six) hours as needed for nausea or vomiting      Facility-Administered Medications: None       Past Medical History:   Diagnosis Date    Asthma     Thrombocytosis ([facility])        Past Surgical History:   Procedure Laterality Date    APPENDECTOMY         No family history on file  I have reviewed and agree with the history as documented  Social History     Tobacco Use    Smoking status: Never Smoker    Smokeless tobacco: Never Used   Substance Use Topics    Alcohol use: Yes     Frequency: Monthly or less     Drinks per session: 1 or 2    Drug use: Never        Review of Systems   Constitutional: Positive for fatigue  Negative for activity change, appetite change, chills, diaphoresis and fever  HENT: Positive for facial swelling, rhinorrhea and sore throat   Negative for congestion, dental problem, drooling, sinus pressure, sinus pain, sneezing, trouble swallowing and voice change  Eyes: Negative for visual disturbance  Respiratory: Positive for chest tightness  Negative for choking, shortness of breath, wheezing and stridor  Cardiovascular: Positive for chest pain  Negative for palpitations and leg swelling  Gastrointestinal: Negative for abdominal distention, abdominal pain, blood in stool, nausea and vomiting  Endocrine: Negative for polyuria  Genitourinary: Negative for difficulty urinating, dyspareunia, dysuria, enuresis, flank pain, frequency, hematuria and vaginal bleeding  Musculoskeletal: Negative for arthralgias, gait problem, neck pain and neck stiffness  Skin: Negative for color change and rash  Allergic/Immunologic: Negative for food allergies  Neurological: Negative for dizziness, tremors, speech difficulty, weakness, light-headedness and headaches  Psychiatric/Behavioral: Negative for confusion  The patient is not nervous/anxious  Physical Exam  ED Triage Vitals   Temperature Pulse Respirations Blood Pressure SpO2   01/03/20 2126 01/03/20 2126 01/03/20 2126 01/03/20 2126 01/03/20 2126   (!) 100 8 °F (38 2 °C) (!) 113 20 121/77 97 %      Temp Source Heart Rate Source Patient Position - Orthostatic VS BP Location FiO2 (%)   01/03/20 2126 01/03/20 2255 01/03/20 2255 01/03/20 2255 --   Oral Monitor Lying Left arm       Pain Score       01/03/20 2126       9             Orthostatic Vital Signs  Vitals:    01/07/20 0702 01/07/20 1535 01/07/20 1905 01/07/20 2142   BP: 102/69 101/67 121/71 120/71   Pulse: 94 81 73 86   Patient Position - Orthostatic VS:  Lying         Physical Exam   Constitutional: She is oriented to person, place, and time  She appears well-developed and well-nourished  No distress  HENT:   Head: Normocephalic and atraumatic     Right Ear: External ear normal    Left Ear: External ear normal    Nose: Nose normal    Large submandibular and submental lymphnodes    One posterior chain node   Eyes: Pupils are equal, round, and reactive to light  Conjunctivae and EOM are normal  No scleral icterus  Neck: Normal range of motion  Neck supple  Cardiovascular: Regular rhythm, normal heart sounds and intact distal pulses  No murmur heard  Sinus tachycardia   Pulmonary/Chest: Effort normal and breath sounds normal  No stridor  No respiratory distress  She has no wheezes  She has no rales  Abdominal: Soft  Bowel sounds are normal  She exhibits no distension and no mass  There is no tenderness  There is no rebound and no guarding  Musculoskeletal: Normal range of motion  She exhibits no edema, tenderness or deformity  Lymphadenopathy:     She has no cervical adenopathy  Neurological: She is alert and oriented to person, place, and time  No cranial nerve deficit or sensory deficit  Skin: Skin is warm and dry  Capillary refill takes less than 2 seconds  No rash noted  She is not diaphoretic  Psychiatric: She has a normal mood and affect  Her behavior is normal  Judgment and thought content normal    Nursing note and vitals reviewed        ED Medications  Medications   acetaminophen (TYLENOL) tablet 650 mg (650 mg Oral Given 1/6/20 2200)   ondansetron (ZOFRAN) injection 4 mg (4 mg Intravenous Given 1/7/20 1524)   colchicine (COLCRYS) tablet 0 6 mg (0 6 mg Oral Given 1/7/20 1813)   sodium chloride 0 9 % infusion (0 mL/hr Intravenous Stopped 1/7/20 2019)   enoxaparin (LOVENOX) subcutaneous injection 40 mg (40 mg Subcutaneous Not Given 1/7/20 0822)   naproxen (NAPROSYN) tablet 500 mg ( Oral Canceled Entry 1/7/20 1531)   famotidine (PEPCID) tablet 20 mg (20 mg Oral Given 1/7/20 2009)   lidocaine (LIDODERM) 5 % patch 1 patch (1 patch Topical Patch Removed 1/7/20 2020)   lidocaine (LIDODERM) 5 % patch 1 patch (1 patch Topical Patch Removed 1/7/20 2020)   sodium chloride 0 9 % bolus 1,000 mL (0 mL Intravenous Stopped 1/4/20 0027)   iohexol (OMNIPAQUE) 350 MG/ML injection (MULTI-DOSE) 85 mL (85 mL Intravenous Given 1/4/20 0019)   potassium chloride (K-DUR,KLOR-CON) CR tablet 40 mEq (40 mEq Oral Given 1/4/20 0857)   ketorolac (TORADOL) injection 15 mg (15 mg Intravenous Given 1/6/20 1451)   ketorolac (TORADOL) injection 15 mg (15 mg Intravenous Given 1/7/20 0540)   azithromycin (ZITHROMAX) tablet 1,000 mg (1,000 mg Oral Given 1/7/20 1305)       Diagnostic Studies  Results Reviewed     Procedure Component Value Units Date/Time    Chlamydia/GC amplified DNA by PCR [[id number]]  (Abnormal) Collected:  01/04/20 0308    Lab Status:  Final result Specimen:  Urine, Other Updated:  01/06/20 2334     N gonorrhoeae, DNA Probe Negative     Chlamydia trachomatis, DNA Probe Positive    Narrative:       Test performed using PCR amplification of target DNA  This test is intended as an aid in the diagnosis of Chlamydial and gonococcal disease  This test has not been evaluated in patients younger than 15years of age and is not recommended for evaluation of suspected sexual abuse  Additional testing is recommended when the results do not correlate with clinical signs and symptoms  EBV acute panel [[id number]]  (Abnormal) Collected:  01/04/20 0619    Lab Status:  Final result Specimen:  Blood from Arm, Left Updated:  01/06/20 1606     EBV Early Antigen Ab, IgG 69 3 U/mL      EBV VCA IgG 140 0 U/mL      EBV VCA IgM 73 6 U/mL      EBV Nuclear Ag Ab >600 0 U/mL      EBV Interp  Comment    Narrative:       Performed at:  [address] [place] [address]  [id number]  : Michaela Gamboa MD, Phone:  [phone]    HIV 1/2 AG-AB combo [[id number]]  (Normal) Collected:  01/04/20 0617    Lab Status:  Final result Specimen:  Blood from Arm, Left Updated:  01/06/20 1047     HIV-1/HIV-2 Ab Non-Reactive    Narrative: This test detects HIV 1, HIV2 and p24 Antigen       Mononucleosis screen [[id number]]  (Normal) Collected:  01/04/20 1346    Lab Status:  Final result Specimen:  Blood from Arm, Left Updated:  01/06/20 0746     Monotest Negative    Comprehensive metabolic panel [[id number]]  (Abnormal) Collected:  01/05/20 0515    Lab Status:  Final result Specimen:  Blood from Arm, Right Updated:  01/05/20 0557     Sodium 140 mmol/L      Potassium 3 9 mmol/L      Chloride 110 mmol/L      CO2 25 mmol/L      ANION GAP 5 mmol/L      BUN 6 mg/dL      Creatinine 0 51 mg/dL      Glucose 73 mg/dL      Calcium 9 1 mg/dL      AST 38 U/L      ALT 74 U/L      Alkaline Phosphatase 115 U/L      Total Protein 8 7 g/dL      Albumin 3 1 g/dL      Total Bilirubin 0 26 mg/dL      eGFR 138 ml/min/1 73sq m     Narrative:       [employer] guidelines for Chronic Kidney Disease (CKD):     Stage 1 with normal or high GFR (GFR > 90 mL/min/1 73 square meters)    Stage 2 Mild CKD (GFR = 60-89 mL/min/1 73 square meters)    Stage 3A Moderate CKD (GFR = 45-59 mL/min/1 73 square meters)    Stage 3B Moderate CKD (GFR = 30-44 mL/min/1 73 square meters)    Stage 4 Severe CKD (GFR = 15-29 mL/min/1 73 square meters)    Stage 5 End Stage CKD (GFR <15 mL/min/1 73 square meters)  Note: GFR calculation is accurate only with a steady state creatinine    CBC and differential [[id number]]  (Abnormal) Collected:  01/05/20 0515    Lab Status:  Final result Specimen:  Blood from Arm, Right Updated:  01/05/20 0540     WBC 8 40 Thousand/uL      RBC 4 54 Million/uL      Hemoglobin 12 1 g/dL      Hematocrit 38 8 %      MCV 86 fL      MCH 26 7 pg      MCHC 31 2 g/dL      RDW 14 6 %      MPV 8 9 fL      Platelets 745 Thousands/uL      nRBC 0 /100 WBCs      Neutrophils Relative 53 %      Immat GRANS % 0 %      Lymphocytes Relative 35 %      Monocytes Relative 8 %      Eosinophils Relative 3 %      Basophils Relative 1 %      Neutrophils Absolute 4 52 Thousands/µL      Immature Grans Absolute 0 02 Thousand/uL      Lymphocytes Absolute 2 91 Thousands/µL      Monocytes Absolute 0 66 Thousand/µL Eosinophils Absolute 0 22 Thousand/µL      Basophils Absolute 0 07 Thousands/µL     Lactate dehydrogenase [[id number]]  (Normal) Collected:  01/04/20 0617    Lab Status:  Final result Specimen:  Blood from Arm, Left Updated:  01/04/20 0951      U/L     Sedimentation rate, automated [[id number]]  (Abnormal) Collected:  01/04/20 0617    Lab Status:  Final result Specimen:  Blood from Arm, Left Updated:  01/04/20 0939     Sed Rate 71 mm/hour     Troponin I [[id number]]  (Abnormal) Collected:  01/04/20 0825    Lab Status:  Final result Specimen:  Blood from Arm, Right Updated:  01/04/20 0852     Troponin I 0 20 ng/mL     Basic metabolic panel [[id number]]  (Abnormal) Collected:  01/04/20 0617    Lab Status:  Final result Specimen:  Blood from Arm, Left Updated:  01/04/20 2262     Sodium 137 mmol/L      Potassium 3 1 mmol/L      Chloride 107 mmol/L      CO2 27 mmol/L      ANION GAP 3 mmol/L      BUN 4 mg/dL      Creatinine 0 67 mg/dL      Glucose 103 mg/dL      Calcium 8 8 mg/dL      eGFR 126 ml/min/1 73sq m     Narrative:       [place] guidelines for Chronic Kidney Disease (CKD):     Stage 1 with normal or high GFR (GFR > 90 mL/min/1 73 square meters)    Stage 2 Mild CKD (GFR = 60-89 mL/min/1 73 square meters)    Stage 3A Moderate CKD (GFR = 45-59 mL/min/1 73 square meters)    Stage 3B Moderate CKD (GFR = 30-44 mL/min/1 73 square meters)    Stage 4 Severe CKD (GFR = 15-29 mL/min/1 73 square meters)    Stage 5 End Stage CKD (GFR <15 mL/min/1 73 square meters)  Note: GFR calculation is accurate only with a steady state creatinine    Magnesium [[id number]]  (Normal) Collected:  01/04/20 0617    Lab Status:  Final result Specimen:  Blood from Arm, Left Updated:  01/04/20 0653     Magnesium 2 4 mg/dL     Phosphorus [[id number]]  (Normal) Collected:  01/04/20 0617    Lab Status:  Final result Specimen:  Blood from Arm, Left Updated:  01/04/20 0653     Phosphorus 3 0 mg/dL     C-reactive protein [[id number]]  (Abnormal) Collected:  01/04/20 0617    Lab Status:  Final result Specimen:  Blood from Arm, Left Updated:  01/04/20 0653     CRP >90 0 mg/L     Troponin I [[id number]]  (Abnormal) Collected:  01/04/20 0617    Lab Status:  Final result Specimen:  Blood from Arm, Left Updated:  01/04/20 0651     Troponin I 0 24 ng/mL     CBC and differential [[id number]]  (Abnormal) Collected:  01/04/20 0617    Lab Status:  Final result Specimen:  Blood from Arm, Left Updated:  01/04/20 0650     WBC 10 11 Thousand/uL      RBC 3 96 Million/uL      Hemoglobin 10 5 g/dL      Hematocrit 33 9 %      MCV 86 fL      MCH 26 5 pg      MCHC 31 0 g/dL      RDW 14 5 %      MPV 9 1 fL      Platelets 803 Thousands/uL      nRBC 0 /100 WBCs      Neutrophils Relative 53 %      Immat GRANS % 0 %      Lymphocytes Relative 36 %      Monocytes Relative 9 %      Eosinophils Relative 1 %      Basophils Relative 1 %      Neutrophils Absolute 5 29 Thousands/µL      Immature Grans Absolute 0 03 Thousand/uL      Lymphocytes Absolute 3 68 Thousands/µL      Monocytes Absolute 0 94 Thousand/µL      Eosinophils Absolute 0 10 Thousand/µL      Basophils Absolute 0 07 Thousands/µL     Troponin I [[id number]]  (Abnormal) Collected:  01/04/20 0300    Lab Status:  Final result Specimen:  Blood from Arm, Left Updated:  01/04/20 0335     Troponin I 0 33 ng/mL     Urine Microscopic [[id number]]  (Abnormal) Collected:  01/04/20 0112    Lab Status:  Final result Specimen:  Urine, Clean Catch Updated:  01/04/20 0130     RBC, UA 20-30 /hpf      WBC, UA 4-10 /hpf      Epithelial Cells Moderate /hpf      Bacteria, UA Occasional /hpf      Hyaline Casts, UA None Seen /lpf     UA w Reflex to Microscopic w Reflex to Culture [[id number]]  (Abnormal) Collected:  01/04/20 0112    Lab Status:  Final result Specimen:  Urine, Clean Catch Updated:  01/04/20 0127     Color, UA Yellow     Clarity, UA Clear     Specific Gravity, UA 1 028     pH, UA 7 5     Leukocytes, UA Negative     Nitrite, UA Negative     Protein, UA Trace mg/dl      Glucose, UA Negative mg/dl      Ketones, UA Negative mg/dl      Urobilinogen, UA 1 0 E U /dl      Bilirubin, UA Negative     Blood, UA Negative    POCT pregnancy, urine [[id number]]  (Normal) Resulted:  01/04/20 0118    Lab Status:  Final result Updated:  01/04/20 0118     EXT PREG TEST UR (Ref: Negative) NEGATIVE     Control VALID    Strep A PCR [[id number]]  (Normal) Collected:  01/03/20 2321    Lab Status:  Final result Specimen:  Throat Updated:  01/04/20 0015     STREP A PCR None Detected    Troponin I [[id number]]  (Abnormal) Collected:  01/03/20 2321    Lab Status:  Final result Specimen:  Blood from Arm, Left Updated:  01/03/20 2354     Troponin I 0 38 ng/mL     Basic metabolic panel [[id number]] Collected:  01/03/20 2321    Lab Status:  Final result Specimen:  Blood from Arm, Left Updated:  01/03/20 2350     Sodium 138 mmol/L      Potassium 3 5 mmol/L      Chloride 104 mmol/L      CO2 28 mmol/L      ANION GAP 6 mmol/L      BUN 7 mg/dL      Creatinine 0 72 mg/dL      Glucose 103 mg/dL      Calcium 8 9 mg/dL      eGFR 120 ml/min/1 73sq m     Narrative:       [place] guidelines for Chronic Kidney Disease (CKD):     Stage 1 with normal or high GFR (GFR > 90 mL/min/1 73 square meters)    Stage 2 Mild CKD (GFR = 60-89 mL/min/1 73 square meters)    Stage 3A Moderate CKD (GFR = 45-59 mL/min/1 73 square meters)    Stage 3B Moderate CKD (GFR = 30-44 mL/min/1 73 square meters)    Stage 4 Severe CKD (GFR = 15-29 mL/min/1 73 square meters)    Stage 5 End Stage CKD (GFR <15 mL/min/1 73 square meters)  Note: GFR calculation is accurate only with a steady state creatinine    CBC and differential [[id number]]  (Abnormal) Collected:  01/03/20 2321    Lab Status:  Final result Specimen:  Blood from Arm, Left Updated:  01/03/20 2339     WBC 10 06 Thousand/uL      RBC 4 51 Million/uL      Hemoglobin 12 3 g/dL      Hematocrit 38 0 % MCV 84 fL      MCH 27 3 pg      MCHC 32 4 g/dL      RDW 14 5 %      MPV 8 9 fL      Platelets 068 Thousands/uL      nRBC 0 /100 WBCs      Neutrophils Relative 62 %      Immat GRANS % 0 %      Lymphocytes Relative 27 %      Monocytes Relative 9 %      Eosinophils Relative 1 %      Basophils Relative 1 %      Neutrophils Absolute 6 29 Thousands/µL      Immature Grans Absolute 0 03 Thousand/uL      Lymphocytes Absolute 2 74 Thousands/µL      Monocytes Absolute 0 89 Thousand/µL      Eosinophils Absolute 0 05 Thousand/µL      Basophils Absolute 0 06 Thousands/µL                  CT soft tissue neck with contrast   Final Result by Sindhu Montoya MD (01/04 2263)      1  Cervical lymphadenopathy with enlarged bilateral jugular chain, submandibular, and submental lymph nodes, nonspecific  These lymph nodes may be reactive however lymphoproliferative disorder is not excluded  Correlate clinically  2   No evidence of peritonsillar abscess or retropharyngeal edema  Workstation performed: [id number]         XR chest 2 views   Final Result by Destiney Stroud MD (01/04 3067)      No acute cardiopulmonary disease              Workstation performed: [id number]               Procedures  ECG 12 Lead Documentation Only  Date/Time: 1/4/2020 1:16 AM  Performed by: Rosanna Melgar MD  Authorized by: Rosanna Melgar MD     Patient location:  ED  Previous ECG:     Previous ECG:  Unavailable    Comparison to cardiac monitor: Yes    Interpretation:     Interpretation: normal    Rate:     ECG rate assessment: normal    Rhythm:     Rhythm: sinus rhythm    Ectopy:     Ectopy: none    QRS:     QRS axis:  Left  Conduction:     Conduction: normal    ST segments:     ST segments:  Normal  T waves:     T waves: normal            ED Course                               MDM  Number of Diagnoses or Management Options  Diagnosis management comments: Patient admitted with concern for viral myocarditis in the setting of pharyngitis with subsequent chest pain and tachycardia and troponin leak  Disposition  Final diagnoses:   Other chest pain   Elevated troponin   LAD (lymphadenopathy), submental   Neck pain   Lymphadenopathy     Time reflects when diagnosis was documented in both MDM as applicable and the Disposition within this note     Time User Action Codes Description Comment    1/4/2020  2:02 AM Susanne Downing Add [R07 89] Other chest pain     1/4/2020  2:02 AM Susanne Downing Modify [R07 89] Other chest pain     1/4/2020  2:02 AM Susanne Downing Add [R79 89] Elevated troponin     1/5/2020  8:15 AM Yoni Joseph Add [R59 0] LAD (lymphadenopathy), submental     1/5/2020  8:15 AM Yoni Joseph Add [M54 2] Neck pain     1/7/2020  3:06 PM Edwin Ramirez Add [R59 1] Lymphadenopathy       ED Disposition     None      Follow-up Information    None         Current Discharge Medication List      CONTINUE these medications which have NOT CHANGED    Details   ondansetron (ZOFRAN) 4 mg tablet Take 4 mg by mouth every 6 (six) hours as needed for nausea or vomiting           No discharge procedures on file  ED Provider  Attending physically available and evaluated Natividad Leyden I managed the patient along with the ED Attending      Electronically Signed by         Rosanna Melgar MD  01/07/20 5640

## 2020-01-04 NOTE — CONSULTS
Consultation - Cardiology Team One  Carmen Jensen 22 y o  female MRN: [id number]  Unit/Bed#: ED 32 Encounter: [id number]    Inpatient consult to Cardiology  Consult performed by: YANIRA Webb  Consult ordered by: Lorice Shone, PA-C          Physician Requesting Consult: Moni Ponce DO     Reason for Consult / Principal Problem: chest pain      Assessment/ Plan:    1  Chest pain: likely 2 2 viral myocarditis/pericarditis as symptoms are suggestive and presented with viral illness  SED rate elevated  Motrin and Colchicine have been ordered; did not get doses yet  Monitor symptoms with above meds   Awaiting echocardiogram read    2  Elevated troponin: this represented Non MI troponin elevation in setting of ciral illness/ag/pericarditis  Check echocardiogram to ensure no abnormalities    3  Viral illness: + fever and chills with cervical lymphadenopathy  Influenza negative; management to primary team      History of Present Illness      HPI: Carmen Jensen is a 22y o  year old female without prior medical hx who presented to ED on 1/3 with complaints fever/chills, cervical lymphadenopathy, nausea, sob and chest pain  Symptoms present for one week  Was evaluated prior to this in [address] [place] [address] ED and diagnosed with viral syndrome but after this discharge is when she noted the chest pain and GÓMEZ  Had taken motrin intermittently at home without any significant relief,     On presentation to ED EKG showed sinus tachycardia with ; no ST/twave abnormalities  She was febrile with temp 100 8; normotensive and SPO2 97% on RA  CT soft tissue neck + cervical lymphadenopathy  Influenza negative  She had a mild troponin elevation 0 38, 0 33, 0 24, 0 22  At time of my exam she tells me she still has pain; worse with deep inspiration  Pain is sharp  No current SOB at rest  Has not gotten any medications besides tylenol at this time  No prior cardiac hx   Does not every get chest pain or exertional symtpoms prior to this event  EKG reviewed personally:  1/3/2020  Sinus rhythm  Vent  rate 100 BPM  AK interval 148 ms  QRS duration 90 ms  QT/QTc 316/407 ms    Telemetry reviewed personally:   Sinus rhythm, intermittent ST with -110s, no significant events    Review of Systems   Constitution: Positive for chills, fever and malaise/fatigue  Negative for decreased appetite         + muscle aches, fevers   Cardiovascular: Positive for chest pain and dyspnea on exertion  Negative for leg swelling, orthopnea, palpitations and syncope  Respiratory: Negative for cough, shortness of breath and sleep disturbances due to breathing  Gastrointestinal: Negative for abdominal pain and nausea  Genitourinary: Negative for dysuria  Neurological: Negative for dizziness and light-headedness  Psychiatric/Behavioral: Negative for altered mental status  Historical Information   Past Medical History:   Diagnosis Date    Asthma     Thrombocytosis ([facility] [place] [address] )      Past Surgical History:   Procedure Laterality Date    APPENDECTOMY       Social History     Substance and Sexual Activity   Alcohol Use No     Social History     Substance and Sexual Activity   Drug Use No     Social History     Tobacco Use   Smoking Status Never Smoker   Smokeless Tobacco Never Used     Family History: No family history on file  Meds/Allergies   current meds:   Current Facility-Administered Medications   Medication Dose Route Frequency    acetaminophen (TYLENOL) tablet 650 mg  650 mg Oral Q6H PRN    colchicine (COLCRYS) tablet 0 6 mg  0 6 mg Oral BID    ibuprofen (MOTRIN) tablet 600 mg  600 mg Oral Q6H [address]    ondansetron (ZOFRAN) injection 4 mg  4 mg Intravenous Q6H PRN    polyethylene glycol (MIRALAX) packet 17 g  17 g Oral Daily PRN     Allergies   Allergen Reactions    Latex Rash     Objective   Vitals: Blood pressure 98/56, pulse 80, temperature 99 4 °F (37 4 °C), temperature source Oral, resp   rate 18, height 5' 1" (1 549 m), weight 55 6 kg (122 lb 9 2 oz), last menstrual period 12/23/2019, SpO2 100 %, unknown if currently breastfeeding ,     Body mass index is 23 16 kg/m²  ,     Systolic (81LML), JLU:420 , Min:93 , ZBE:714     Diastolic (73OHY), MEHREEN:22, Min:55, Max:77      Intake/Output Summary (Last 24 hours) at 1/4/2020 1123  Last data filed at 1/4/2020 0946  Gross per 24 hour   Intake 1339 17 ml   Output    Net 1339 17 ml     Weight (last 2 days)     Date/Time   Weight    01/04/20 0400   55 6 (122 58)    01/03/20 2126   55 6 (122 58)            Invasive Devices     Peripheral Intravenous Line            Peripheral IV 01/03/20 Left Antecubital less than 1 day              Physical Exam   Constitutional: She is oriented to person, place, and time  No distress  Pt sitting up in bed eating in NAD; alert and cooperative   HENT:   Head: Normocephalic and atraumatic  Neck: No JVD present  Cardiovascular: Normal rate, regular rhythm, S1 normal and S2 normal  Exam reveals no friction rub  No murmur heard  Pulmonary/Chest: Effort normal and breath sounds normal  She has no wheezes  She has no rales  LS CTA, on RA   Abdominal: Soft  Musculoskeletal: She exhibits no deformity  Neurological: She is alert and oriented to person, place, and time  Skin: Skin is warm and dry  She is not diaphoretic  Psychiatric: She has a normal mood and affect  Her behavior is normal    Nursing note and vitals reviewed      LABORATORY RESULTS:  Results from last 7 days   Lab Units 01/04/20  0825 01/04/20  0617 01/04/20  0300   TROPONIN I ng/mL 0 20* 0 24* 0 33*     CBC with diff:   Results from last 7 days   Lab Units 01/04/20  0617 01/03/20  2321   WBC Thousand/uL 10 11 10 06   HEMOGLOBIN g/dL 10 5* 12 3   HEMATOCRIT % 33 9* 38 0   MCV fL 86 84   PLATELETS Thousands/uL 381 430*   MCH pg 26 5* 27 3   MCHC g/dL 31 0* 32 4   RDW % 14 5 14 5   MPV fL 9 1 8 9   NRBC AUTO /100 WBCs 0 0     CMP:  Results from last 7 days   Lab Units 01/04/20  0617 01/03/20  2321   POTASSIUM mmol/L 3 1* 3 5   CHLORIDE mmol/L 107 104   CO2 mmol/L 27 28   BUN mg/dL 4* 7   CREATININE mg/dL 0 67 0 72   CALCIUM mg/dL 8 8 8 9   EGFR ml/min/1 73sq m 126 120     BMP:  Results from last 7 days   Lab Units 01/04/20  0617 01/03/20  2321   POTASSIUM mmol/L 3 1* 3 5   CHLORIDE mmol/L 107 104   CO2 mmol/L 27 28   BUN mg/dL 4* 7   CREATININE mg/dL 0 67 0 72   CALCIUM mg/dL 8 8 8 9      Results from last 7 days   Lab Units 01/04/20  0617   MAGNESIUM mg/dL 2 4     Imaging: I have personally reviewed pertinent reports  Ct Soft Tissue Neck With Contrast    Result Date: 1/4/2020  Narrative: CT NECK WITH CONTRAST INDICATION:   submandibular swelling  Pain on swallowing  COMPARISON:  CT of the face on January 5, 2016  TECHNIQUE:  Axial, sagittal, and coronal 2D reformatted images were created from the axial source data and submitted for interpretation  Radiation dose length product (DLP) for this visit:  354 81 mGy-cm   This examination, like all CT scans performed in the [facility], was performed utilizing techniques to minimize radiation dose exposure, including the use of iterative  reconstruction and automated exposure control  IV Contrast:  85 mL of iohexol (OMNIPAQUE)  was administered intravenously without immediate adverse reaction  IMAGE QUALITY:  Diagnostic  FINDINGS: VISUALIZED BRAIN PARENCHYMA:  No acute intracranial pathology of the visualized brain parenchyma  VISUALIZED ORBITS AND PARANASAL SINUSES:  Normal  NASAL CAVITY AND NASOPHARYNX:  Normal  SUPRAHYOID NECK:  Normal oral cavity, tongue base, tonsillar fossa and epiglottis  INFRAHYOID NECK:  Aryepiglottic folds and piriform sinuses are normal   Normal glottis and subglottic airway  THYROID GLAND:  Unremarkable  PAROTID AND SUBMANDIBULAR GLANDS:  Normal  LYMPH NODES:  There is cervical lymphadenopathy for example left submandibular lymph nodes measure up to 1 1 cm in short axis (series 2, image 48)    There are bilateral submental lymph nodes which measure up to 1 2 cm in short axis (series 2, image 56)  Bilateral jugular chain lymph nodes measure up to 1 3 cm in short axis  No significant upper mediastinal lymphadenopathy  VASCULAR STRUCTURES:  Normal enhancement of the cervical vasculature  THORACIC INLET:  Lung apices and upper mediastinum are unremarkable  BONY STRUCTURES: No acute fracture or destructive osseous lesion  Impression: 1  Cervical lymphadenopathy with enlarged bilateral jugular chain, submandibular, and submental lymph nodes, nonspecific  These lymph nodes may be reactive however lymphoproliferative disorder is not excluded  Correlate clinically  2   No evidence of peritonsillar abscess or retropharyngeal edema  Workstation performed: [id number]     Assessment  Principal Problem:    Chest pain  Active Problems:    Elevated troponin    LAD (lymphadenopathy), submental    Neck pain    SIRS (systemic inflammatory response syndrome) ([facility] [address] )    Thank you for allowing us to participate in this patient's care  Counseling / Coordination of Care  Total floor / unit time spent today 45 minutes  Greater than 50% of total time was spent with the patient and / or family counseling and / or coordination of care  A description of the counseling / coordination of care: Review of history, current assessment, development of a plan  Code Status: Level 1 - Full Code    ** Please Note: Dragon 360 Dictation voice to text software may have been used in the creation of this document   **

## 2020-01-04 NOTE — ASSESSMENT & PLAN NOTE
· 0 38/0 33/0 24  · In setting of viral illness suspect myocarditis  · Echo pending  · Cardiology evaluation pending

## 2020-01-04 NOTE — UTILIZATION REVIEW
Initial Clinical Review    Admission: Date/Time/Statement:  01/04/20  @ 0135 -- OBS UPGRADED TO INPATIENT 1/04 @ [facility] MYOCARDITIS/PERICARDITIS    Start   Ordered   01/04/20 1617  Inpatient Admission Once     Transfer Service: General Medicine       Question Answer Comment   Admitting Physician Mark Cedillo    Level of Care Med Surg        01/04/20 1616       ED Arrival Information     Expected Arrival Acuity Means of Arrival Escorted By Service Admission Type    - 1/3/2020 21:13 Urgent Walk-In Self Hospitalist Urgent    Arrival Complaint    Swollen Neck        Chief Complaint   Patient presents with    Neck Swelling     woke up last week with swollen neck, then started with pains and fever, neck pain is still continuing, having sharp pain in chest, pain in the back of neck, body is feeling super achey, also having sweats at night  Assessment/Plan: 19 y o  female with no sig PMHx who presents to ED with 1week of SOB, f/c, LAD, n/v, and 2 days of CP  Was seen in [address] for viral syndrome on 12/30, returned today to ED for new CP and GÓMEZ and worsening LAD  In ED trop 0 38, EKG -- wnl  On exam, she is resting in bed  Lymphadenopathy submental, anterior/posterior cervical chain, occipital   Heart RRR  Lungs CTA b/l  Abdomen +BS, soft, non-tender  Admit observation to M/S/Tele unit with Chest pain, suspected viral myocarditis  Tele monitoring, serial trops  Echo, labs, consult cardiology, analgesics prn    Cardiology consult 1/4 --- Chest pain: likely 2 2 viral myocarditis/pericarditis as symptoms are suggestive and presented with viral illness  SED rate elevated  Motrin and Colchicine have been ordered; did not get doses yet  Monitor symptoms with meds  Awaiting echocardiogram read  Suspect elevated trop represents Non MI troponin elevation in setting of ciral illness/ag/pericarditis    1/5 --- Presumed myopericarditis   Thus far pain not significantly improved with Ibuprofen 600 q6hrs and colchicine 0 2 tid  Increase Ibuprofen to 800 tid and continue colchicine  ESR and CRP were elevated on presentation  Continue to monitor for improvement in symptoms    ID consult 1/5 -- Systemic inflammatory response syndrome-POA  Fever and tachycardia  Suspect secondary to a viral infection    Consideration for the possibility of a lymphoproliferative disorder such as lymphoma   -monitor off all antibiotics for now  -follow-up Monospot, EBV serology, and HIV screen  -check CMV serology  -check HIV RNA  -check blood cultures x2 sets for completeness  -recheck CBC with diff and CMP  -if the lymphadenopathy would persist without a source, would recommend lymph node biopsy  -supportive care      ED Triage Vitals   Temperature Pulse Respirations Blood Pressure SpO2   01/03/20 2126 01/03/20 2126 01/03/20 2126 01/03/20 2126 01/03/20 2126   (!) 100 8 °F (38 2 °C) (!) 113 20 121/77 97 %      Temp Source Heart Rate Source Patient Position - Orthostatic VS BP Location FiO2 (%)   01/03/20 2126 01/03/20 2255 01/03/20 2255 01/03/20 2255 --   Oral Monitor Lying Left arm       Pain Score       01/03/20 2126       9        Wt Readings from Last 1 Encounters:   01/04/20 55 6 kg (122 lb 9 2 oz)     Additional Vital Signs:   Date/Time  Temp  Pulse  Resp  BP  SpO2  O2 Device   01/04/20 0830    84  18  96/65  100 %  None (Room air)   01/04/20 0645    78  20  93/55  99 %  None (Room air)   01/04/20 0500    92  20  101/71  100 %  None (Room air)   01/04/20 0400  99 4 °F (37 4 °C)  96  20  112/61  99 %  None (Room air)   01/04/20 0330    104  20    99 %  None (Room air)   01/04/20 0300    108Abnormal   20  108/72  100 %  None (Room air)   01/04/20 0109    103  20  112/65  100 %     01/03/20 2255    106Abnormal   18  118/70  98 %  None (Room air)   01/03/20 2126  100 8 °F (38 2 °C)Abnormal   113Abnormal   20  121/77  97 %         Pertinent Labs/Diagnostic Test Results:   CT soft tissue of [facility] 1/4 -- 1    Cervical lymphadenopathy with enlarged bilateral jugular chain, submandibular, and submental lymph nodes, nonspecific   These lymph nodes may be reactive however lymphoproliferative disorder is not excluded   Correlate clinically  2   No evidence of peritonsillar abscess or retropharyngeal edema  CXR 1/4 -- No acute cardiopulmonary disease        Results from last 7 days   Lab Units 01/04/20  0617 01/03/20  2321   WBC Thousand/uL 10 11 10 06   HEMOGLOBIN g/dL 10 5* 12 3   HEMATOCRIT % 33 9* 38 0   PLATELETS Thousands/uL 381 430*   NEUTROS ABS Thousands/µL 5 29 6 29     Results from last 7 days   Lab Units 01/04/20  0617 01/03/20  2321   SODIUM mmol/L 137 138   POTASSIUM mmol/L 3 1* 3 5   CHLORIDE mmol/L 107 104   CO2 mmol/L 27 28   ANION GAP mmol/L 3* 6   BUN mg/dL 4* 7   CREATININE mg/dL 0 67 0 72   EGFR ml/min/1 73sq m 126 120   CALCIUM mg/dL 8 8 8 9   MAGNESIUM mg/dL 2 4  --    PHOSPHORUS mg/dL 3 0  --      Results from last 7 days   Lab Units 01/04/20  0617 01/03/20  2321   GLUCOSE RANDOM mg/dL 103 103     Results from last 7 days   Lab Units 01/04/20  0825 01/04/20  0617 01/04/20  0300 01/03/20  2321   TROPONIN I ng/mL 0 20* 0 24* 0 33* 0 38*     Results from last 7 days   Lab Units 01/04/20  0617   CRP mg/L >90 0*   SED RATE mm/hour 71*     Results from last 7 days   Lab Units 01/04/20  0112   CLARITY UA  Clear   COLOR UA  Yellow   SPEC GRAV UA  1 028   PH UA  7 5   GLUCOSE UA mg/dl Negative   KETONES UA mg/dl Negative   BLOOD UA  Negative   PROTEIN UA mg/dl Trace*   NITRITE UA  Negative   BILIRUBIN UA  Negative   UROBILINOGEN UA E U /dl 1 0   LEUKOCYTES UA  Negative   WBC UA /hpf 4-10*   RBC UA /hpf 20-30*   BACTERIA UA /hpf Occasional   EPITHELIAL CELLS WET PREP /hpf Moderate*     Results from last 7 days   Lab Units 12/30/19  1944   INFLUENZA A PCR  None Detected   RSV PCR  None Detected         ED Treatment:   Medication Administration from 01/03/2020 2113 to 01/04/2020 1036       Date/Time Order Dose Route Action     01/03/2020 2324 sodium chloride 0 9 % bolus 1,000 mL 1,000 mL Intravenous New Bag     01/04/2020 0019 iohexol (OMNIPAQUE) 350 MG/ML injection (MULTI-DOSE) 85 mL 85 mL Intravenous Given     01/04/2020 0259 lactated ringers infusion 50 mL/hr Intravenous New Bag     01/04/2020 0857 acetaminophen (TYLENOL) tablet 650 mg 650 mg Oral Given     01/04/2020 0300 acetaminophen (TYLENOL) tablet 650 mg 650 mg Oral Given     01/04/2020 0857 potassium chloride (K-DUR,KLOR-CON) CR tablet 40 mEq 40 mEq Oral Given     Past Medical History:   Diagnosis Date    Asthma     Thrombocytosis ([facility] [address] )      Present on Admission:   Chest pain   Elevated troponin   LAD (lymphadenopathy), submental   Neck pain   SIRS (systemic inflammatory response syndrome) (HCC)      Admitting Diagnosis: Neck swelling [R22 1]  Age/Sex: 22 y o  female  Admission Orders:  Scheduled Medications:  Medications:  ibuprofen 600 mg Oral Q6H [address]        PRN Meds:  acetaminophen 650 mg Oral Q6H PRN 1/4 x2, 1/5 x1   ondansetron 4 mg Intravenous Q6H PRN   polyethylene glycol 17 g Oral Daily PRN       IP CONSULT TO CARDIOLOGY    Network Utilization Review Department  [email or web address]  ATTENTION: Please call with any questions or concerns to [phone] and carefully listen to the prompts so that you are directed to the right person  All voicemails are confidential   Neena Eron all requests for admission clinical reviews, approved or denied determinations and any other requests to dedicated fax number below belonging to the campus where the patient is receiving treatment   List of dedicated fax numbers for the Facilities:  FACILITY NAME UR FAX NUMBER   ADMISSION DENIALS (Administrative/Medical Necessity) [phone]   [address] (Maternity/NICU/Pediatrics) [phone]   Shelbi Emerson [address] [phone]   [facility]  [phone] [address] [facility]  [phone] [address] [facility] 1000 [facility] [phone]

## 2020-01-04 NOTE — PROGRESS NOTES
Progress Note Hernando Velazquez 2000, 22 y o  female MRN: [id number]    Unit/Bed#: ED 26 Encounter: [id number]    Primary Care Provider: YANIRA Enriquez   Date and time admitted to hospital: 1/3/2020  9:54 PM      * Chest pain  Assessment & Plan  Pt presented with 1 weeks of SOB, f/c, LAD, n/v, and 2 days of CP  Was seen in [facility] for viral syndrome  Returned 1/3 for new CP and GÓMEZ and worsening LAD  · No cardiac hx   · Elevated trop 0 38/0 33/0 24  · EKG [facility]  · Suspected viral myocarditis  Could also be component of costochondritis   · Consult cardiology   · CRP >90   EBV, ESR, HIV pending  · Echo pending     SIRS (systemic inflammatory response syndrome) (HCC)  Assessment & Plan  · Tachycardia and febrile on admission   · With suspected myocarditis   · See plan above     Neck pain  Assessment & Plan  · Pt reports to posterior neck pain  Suspected in setting of lymphadenopathy with significantly enlarged lymph nodes on exam   · No osseous abnormality seen on CT  · Tylenol PRN, Ibuprofen ATC   · Monitor     LAD (lymphadenopathy), submental  Assessment & Plan  · Significant LAD on admission   · CT neck  - Cervical lymphadenopathy with enlarged bilateral jugular chain, submandibular, and submental lymph nodes, nonspecific  These lymph nodes may be reactive however lymphoproliferative disorder is not excluded  Correlate clinically  2   No evidence of peritonsillar abscess or retropharyngeal edema  · No difficulty breathing/swallowing   · Continue to monitor   · Check LDH        Elevated troponin  Assessment & Plan  · 0 38/0 33/0 24  · In setting of viral illness suspect myocarditis  · Echo pending  · Cardiology evaluation pending     POST-ADMIT CHECK    Patient states she has not felt well for the last week  She started having chest pain about three days ago  It does not radiate  She describes it as a sharp pain, worse with inspiration    She states she has very swollen lymph nodes throughout her neck/back of neck which are tender  She denies sore throat or difficulty swallowing  She is able to move her neck, states it is just painful  On exam, she is resting in bed  Lymphadenopathy submental, anterior/posterior cervical chain, occipital   Heart RRR  Lungs CTA b/l  Abdomen +BS, soft, non-tender

## 2020-01-04 NOTE — ASSESSMENT & PLAN NOTE
Pt presented with 1 weeks of SOB, f/c, LAD, n/v, and 2 days of CP  Was seen in [facility] for viral syndrome  Returned 1/3 for new CP and GÓMEZ and worsening LAD  · No cardiac hx   · Elevated trop 0 38/0 33/0 24  · EKG [facility]  · Suspected viral myocarditis    Could also be component of costochondritis   · Consult cardiology   · CRP >90   EBV, ESR, HIV pending  · Echo pending

## 2020-01-04 NOTE — ED ATTENDING ATTESTATION
1/3/2020  I, Kacy Villarreal MD, saw and evaluated the patient  I have discussed the patient with the resident and agree with the resident's findings, Plan of Care, and MDM as documented in the resident's note, unless otherwise documented below  All available labs and Radiology studies were reviewed by myself  I was present for key portions of any procedure(s) performed by the resident and I was immediately available to provide assistance  I agree with the current assessment done in the Emergency Department  I have conducted an independent evaluation of this patient  Briefly, this is a 22 y o  female with past medical history of asthma presenting with swelling of her anterior neck, fevers, chills, generalized body aches, as well as pain in the back of the neck  Patient reports that 1 week ago, she developed swollen lumps in her throat, particularly in submental region, and had fevers and chills and sweats  She was seen at [facility] on 12/30 for the symptoms, was negative for influenza A/B and RSV  Was discharged with plan for symptomatic treatment with Motrin  Yesterday and today, patient has had sharp anterior chest pains on the left side of her chest   Nonradiating  Not associated with shortness of breath unless she is coughing  Denies significant cough  No palpitations  Patient does report that she has been visiting with her boyfriend last week, he was starting to develop cough, she is wondering if she was exposed to an illness while visiting  Patient reports history of pyelonephritis  She reports that usually, when this is happening, she has significant flank pain and malodorous urine  She does report a somewhat malodorous vaginal discharge without any pruritus, but denies possibility of STI  Food review of systems, patient reports headaches, fevers, chills, sweats, swollen lymph nodes in her neck, neck discomfort, decreased appetite    She denies difficulty swallowing, difficulty breathing, nausea, vomiting, diarrhea, abdominal pain  She reports nonradiating chest pain as above  There is no leg swelling   symptoms as above  Physical Exam  Vitals:    01/03/20 2126 01/03/20 2255 01/04/20 0109   BP: 121/77 118/70 112/65   TempSrc: Oral     Pulse: (!) 113 (!) 106 103   Resp: 20 18 20   Patient Position - Orthostatic VS:  Lying Lying   Temp: (!) 100 8 °F (38 2 °C)         Constitutional:  Awake, alert, oriented  No acute distress  HEENT:  Normocephalic, atraumatic  Sclera anicteric, conjunctiva not injected  Moist oral mucosa  1+ symmetric tonsils without erythema or exudates  No herpangina  There is palpable tender subcentimeter lymphadenopathy of submental region, anterior cervical chain, as well as posterior right occipital region  Floor of mouth is soft without woody edema  Left submandibular gland is somewhat pronounced and tender to palpation  There is no parotid edema or pain  Cardiac:  Regular rate and rhythm, no murmurs, rubs, or gallops, including with patient is sitting forward     2+ radial pulses  Respiratory:  Lungs are clear to auscultation bilaterally, no wheezes or rales  Abdomen:  Nondistended  Bowel sounds present  No tenderness to palpation  No hepatic splenomegaly  No rebound or guarding  Extremities:  No deformities, no edema  Integument:  No rashes or exposed areas, cap refill less than 2 seconds  Neurologic:  Awake, alert, and oriented x3  Nonfocal exam   Psychiatric:  Normal affect    ED Course  18-year-old female presenting with cervical lymphadenopathy, fevers, chills, as well as chest pain  Vital signs reviewed, patient is febrile, she is mildly tachycardic, she is within normal limits otherwise    Differential diagnosis includes current viral illness with associated lymphadenopathy and chest discomfort, with etiologies including mononucleosis, viral respiratory illness, lymphadenopathy secondary to Streptococcus pharyngitis (not supported by physical exam), with other etiologies of chest pain including pleuritic chest pain, pericarditis, myocarditis, versus another etiology of symptoms  EKG obtained, is reviewed by me, as below  Labs reveal CBC without leukocytosis, with mild thrombocytosis likely is an acute phase reactant, BMP is unremarkable  UA is with microscopic hematuria and occasional bacteria but with moderate epithelial cells raising question of inappropriately collected sample  Will await culture prior to treatment  Urine pregnancy is negative  Chest x-ray to my review is without infiltrates, without cardiomegaly  Patient's troponin is elevated at 0 38, concerning for mild myocarditis  CT of the soft tissues of the neck reveals lymphadenopathy without evidence of lymphadenitis or of deep space infection  Case discussed with internal medicine the patient will be admitted for further evaluation and care  ECG 12 Lead Documentation Only  Date/Time: 1/3/2020 10:25 PM  Performed by: Telma Zhou MD  Authorized by: Telma Zhou MD     Comments:      Sinus tachycardia, ventricular rate 100, VA interval 148, QRS 90, , normal axis, no VA segment depression or ST elevation to suggest acute pericarditis, nonspecific T-wave abnormalities present, particularly in anterior precordial leads, no STEMI          Clinical Impression  Final diagnoses:   Other chest pain   Elevated troponin

## 2020-01-04 NOTE — PLAN OF CARE
Problem: PAIN - ADULT  Goal: Verbalizes/displays adequate comfort level or baseline comfort level  Description  Interventions:  - Encourage patient to monitor pain and request assistance  - Assess pain using appropriate pain scale  - Administer analgesics based on type and severity of pain and evaluate response  - Implement non-pharmacological measures as appropriate and evaluate response  - Consider cultural and social influences on pain and pain management  - Notify physician/advanced practitioner if interventions unsuccessful or patient reports new pain  Outcome: Progressing     Problem: SAFETY ADULT  Goal: Patient will remain free of falls  Description  INTERVENTIONS:  - Assess patient frequently for physical needs  -  Identify cognitive and physical deficits and behaviors that affect risk of falls    -  [place] fall precautions as indicated by assessment   - Educate patient/family on patient safety including physical limitations  - Instruct patient to call for assistance with activity based on assessment  - Modify environment to reduce risk of injury  - Consider OT/PT consult to assist with strengthening/mobility  Outcome: Progressing  Goal: Maintain or return to baseline ADL function  Description  INTERVENTIONS:  -  Assess patient's ability to carry out ADLs; assess patient's baseline for ADL function and identify physical deficits which impact ability to perform ADLs (bathing, care of mouth/teeth, toileting, grooming, dressing, etc )  - Assess/evaluate cause of self-care deficits   - Assess range of motion  - Assess patient's mobility; develop plan if impaired  - Assess patient's need for assistive devices and provide as appropriate  - Encourage maximum independence but intervene and supervise when necessary  - Involve family in performance of ADLs  - Assess for home care needs following discharge   - Consider OT consult to assist with ADL evaluation and planning for discharge  - Provide patient education as appropriate  Outcome: Progressing  Goal: Maintain or return mobility status to optimal level  Description  INTERVENTIONS:  - Assess patient's baseline mobility status (ambulation, transfers, stairs, etc )    - Identify cognitive and physical deficits and behaviors that affect mobility  - Identify mobility aids required to assist with transfers and/or ambulation (gait belt, sit-to-stand, lift, walker, cane, etc )  - [place] fall precautions as indicated by assessment  - Record patient progress and toleration of activity level on Mobility SBAR; progress patient to next Phase/Stage  - Instruct patient to call for assistance with activity based on assessment  - Consider rehabilitation consult to assist with strengthening/weightbearing, etc   Outcome: Progressing     Problem: DISCHARGE PLANNING  Goal: Discharge to home or other facility with appropriate resources  Description  INTERVENTIONS:  - Identify barriers to discharge w/patient and caregiver  - Arrange for needed discharge resources and transportation as appropriate  - Identify discharge learning needs (meds, wound care, etc )  - Arrange for interpretive services to assist at discharge as needed  - Refer to Case Management Department for coordinating discharge planning if the patient needs post-hospital services based on physician/advanced practitioner order or complex needs related to functional status, cognitive ability, or social support system  Outcome: Progressing     Problem: Knowledge Deficit  Goal: Patient/family/caregiver demonstrates understanding of disease process, treatment plan, medications, and discharge instructions  Description  Complete learning assessment and assess knowledge base    Interventions:  - Provide teaching at level of understanding  - Provide teaching via preferred learning methods  Outcome: Progressing     Problem: CARDIOVASCULAR - ADULT  Goal: Maintains optimal cardiac output and hemodynamic stability  Description  INTERVENTIONS:  - Monitor I/O, vital signs and rhythm  - Monitor for S/S and trends of decreased cardiac output  - Administer and titrate ordered vasoactive medications to optimize hemodynamic stability  - Assess quality of pulses, skin color and temperature  - Assess for signs of decreased coronary artery perfusion  - Instruct patient to report change in severity of symptoms  Outcome: Progressing  Goal: Absence of cardiac dysrhythmias or at baseline rhythm  Description  INTERVENTIONS:  - Continuous cardiac monitoring, vital signs, obtain 12 lead EKG if ordered  - Administer antiarrhythmic and heart rate control medications as ordered  - Monitor electrolytes and administer replacement therapy as ordered  Outcome: Progressing

## 2020-01-04 NOTE — ASSESSMENT & PLAN NOTE
· Significant LAD on admission   · CT neck  - Cervical lymphadenopathy with enlarged bilateral jugular chain, submandibular, and submental lymph nodes, nonspecific  These lymph nodes may be reactive however lymphoproliferative disorder is not excluded  Correlate clinically  2   No evidence of peritonsillar abscess or retropharyngeal edema    · No difficulty breathing/swallowing   · Continue to monitor   · Check LDH

## 2020-01-04 NOTE — ASSESSMENT & PLAN NOTE
· Pt reports to posterior neck pain    Suspected in setting of lymphadenopathy with significantly enlarged lymph nodes on exam   · No osseous abnormality seen on CT  · Tylenol PRN, Ibuprofen ATC   · Monitor

## 2020-01-04 NOTE — PROGRESS NOTES
[address] Internal Medicine     Progress Note Jenifer Petersen 2000, 22 y o  female MRN: [id number]    Unit/Bed#: ED 26 Encounter: [id number]    Primary Care Provider: YANIRA Cheek   Date and time admitted to hospital: 1/3/2020  9:54 PM      * Chest pain  Assessment & Plan  Pt presented with 1 weeks of SOB, f/c, LAD, n/v, and 2 days of CP  Was seen in [facility] for viral syndrome  Returned 1/3 for new CP and GÓMEZ and worsening LAD  · No cardiac hx   · Elevated trop 0 38  · EKG [facility]  · Suspected viral myocarditis   · Consult cardiology   · SED, CRP, EBV pending   · Echo ordered   · Continue to trend trops     SIRS (systemic inflammatory response syndrome) (HCC)  Assessment & Plan  · Tachycardia and febrile on admission   · With suspected myocarditis   · See plan above     Elevated troponin  Assessment & Plan  · 0 38 on admission  · See plan above     Neck pain  Assessment & Plan  · Posterior neck pain - shooting in quality x 1 week  · May be 2/2 LAD  · No osseous abnormality seen on CT  · Tylenol now for pain   · Monitor     LAD (lymphadenopathy), submental  Assessment & Plan  · Significant LAD on admission   · CT neck  - Cervical lymphadenopathy with enlarged bilateral jugular chain, submandibular, and submental lymph nodes, nonspecific  These lymph nodes may be reactive however lymphoproliferative disorder is not excluded  Correlate clinically  2   No evidence of peritonsillar abscess or retropharyngeal edema  · No difficulty breathing/swallowing   · Continue to monitor          VTE Prophylaxis: Low risk   / sequential compression device   Code Status: Full code   POLST: POLST form is not discussed and not completed at this time  Discussion with family: Discussed plan with patient  Patient did not want other family updated at this time  Pt agreeable to plan       Anticipated Length of Stay:  Patient will be admitted on an Observation basis with an anticipated length of stay of  Less than 2 midnights  Justification for Hospital Stay: continued workup and telemetry monitoring     Total Time for Visit, including Counseling / Coordination of Care: 45 minutes  Greater than 50% of this total time spent on direct patient counseling and coordination of care  Chief Complaint:   I feel sick     History of Present Illness:    Kranthi Lawson is a 22 y o  female with no sig PMH who presents with 1 week for LAD, f/c, n/v, and sob  Pt was seen at the [facility] ED 12/30 and was diagnosed with a viral syndrome  Pt then developed CP, GÓMEZ, and worsening LAD in the submental jaw  She was only taking ibuprofen to help, which she did not feel that it did  She notes all her symptoms began with the swelling under her jaw, then n/v  She was unable to keep anything down except for water  After the initial ER visit, the patient developed myalgias and CP  And fatigue w/ GÓMEZ and would not complete her usual ADLs  Pt denies recent travel, cardiac hx  Was on abx for pyelonephritis recently but has been asymptomatic since taking part of her dose of abx  Review of Systems:    Review of Systems   Constitutional: Positive for activity change, appetite change, chills, fatigue and fever  HENT: Positive for congestion, facial swelling and rhinorrhea  Negative for sneezing, sore throat and trouble swallowing  Eyes: Negative for pain and visual disturbance  Respiratory: Positive for shortness of breath  Negative for cough  Cardiovascular: Positive for chest pain  Negative for palpitations and leg swelling  Gastrointestinal: Positive for nausea and vomiting  Negative for abdominal pain and diarrhea  Genitourinary: Negative for dysuria and hematuria  Musculoskeletal: Positive for myalgias and neck pain  Negative for arthralgias  Skin: Negative for rash and wound  Neurological: Negative for dizziness, weakness, light-headedness and numbness  Psychiatric/Behavioral: Negative for agitation and confusion  Past Medical and Surgical History:     Past Medical History:   Diagnosis Date    Asthma     Thrombocytosis ([address] )        Past Surgical History:   Procedure Laterality Date    APPENDECTOMY         Meds/Allergies:    Prior to Admission medications    Medication Sig Start Date End Date Taking? Authorizing Provider   ondansetron (ZOFRAN-ODT) 4 mg disintegrating tablet Take 1 tablet (4 mg total) by mouth every 6 (six) hours as needed for nausea or vomiting  Patient not taking: Reported on 1/3/2020 12/30/19   Abigail Beck MD     I have reviewed home medications with patient personally  Allergies: Allergies   Allergen Reactions    Latex Rash       Social History:     Marital Status: Single     Substance Use History:   Social History     Substance and Sexual Activity   Alcohol Use No     Social History     Tobacco Use   Smoking Status Never Smoker   Smokeless Tobacco Never Used     Social History     Substance and Sexual Activity   Drug Use No       Family History:    non-contributory    Physical Exam:     Vitals:   Blood Pressure: 112/65 (01/04/20 0109)  Pulse: 103 (01/04/20 0109)  Temperature: (!) 100 8 °F (38 2 °C) (01/03/20 2126)  Temp Source: Oral (01/03/20 2126)  Respirations: 20 (01/04/20 0109)  Weight - Scale: 55 6 kg (122 lb 9 2 oz) (01/03/20 2126)  SpO2: 100 % (01/04/20 0109)    Physical Exam   Constitutional: She appears well-developed and well-nourished  No distress  Eyes: Pupils are equal, round, and reactive to light  Conjunctivae and EOM are normal    Neck: Neck supple  No JVD present  No tracheal deviation present  Cardiovascular: Normal rate and regular rhythm  No murmur heard  Pulmonary/Chest: Effort normal and breath sounds normal  She has no wheezes  She has no rales  Abdominal: Soft  Bowel sounds are normal  She exhibits no distension  There is no tenderness  Musculoskeletal: She exhibits no edema     No nunchal rigidity    Lymphadenopathy:     She has cervical adenopathy (Submental bl, anterior chain, and occipital LAD)  Neurological:   Alert and oriented    Skin: Skin is warm and dry  Capillary refill takes less than 2 seconds  She is not diaphoretic  Psychiatric: She has a normal mood and affect  Her behavior is normal    Nursing note and vitals reviewed  Additional Data:     Lab Results: I have personally reviewed pertinent reports  Results from last 7 days   Lab Units 01/03/20  2321   WBC Thousand/uL 10 06   HEMOGLOBIN g/dL 12 3   HEMATOCRIT % 38 0   PLATELETS Thousands/uL 430*   NEUTROS PCT % 62   LYMPHS PCT % 27   MONOS PCT % 9   EOS PCT % 1     Results from last 7 days   Lab Units 01/03/20  2321   SODIUM mmol/L 138   POTASSIUM mmol/L 3 5   CHLORIDE mmol/L 104   CO2 mmol/L 28   BUN mg/dL 7   CREATININE mg/dL 0 72   ANION GAP mmol/L 6   CALCIUM mg/dL 8 9   GLUCOSE RANDOM mg/dL 103                       Imaging: I have personally reviewed pertinent reports  CT soft tissue neck with contrast   Final Result by Heber Blizzard, MD (10/36 2181)      1  Cervical lymphadenopathy with enlarged bilateral jugular chain, submandibular, and submental lymph nodes, nonspecific  These lymph nodes may be reactive however lymphoproliferative disorder is not excluded  Correlate clinically  2   No evidence of peritonsillar abscess or retropharyngeal edema  Workstation performed: [id number]         XR chest 2 views    (Results Pending)       EKG, Pathology, and Other Studies Reviewed on Admission:   · EKG    Allscripts / Epic Records Reviewed: Yes     ** Please Note: This note has been constructed using a voice recognition system   **

## 2020-01-04 NOTE — H&P
[address] [facility]      Progress Note Littie Range 2000, 22 y o  female MRN: [id number]     Unit/Bed#: ED 26 Encounter: [id number]     Primary Care Provider: YANIRA Araujo   Date and time admitted to hospital: 1/3/2020  9:54 PM        * Chest pain  Assessment & Plan  Pt presented with 1 weeks of SOB, f/c, LAD, n/v, and 2 days of CP  Was seen in [facility] [place] [facility] for viral syndrome  Returned 1/3 for new CP and GÓMEZ and worsening LAD  · No cardiac hx   · Elevated trop 0 38  · EKG [facility]  · Suspected viral myocarditis   · Consult cardiology   · SED, CRP, EBV pending   · Echo ordered   · Continue to trend trops      SIRS (systemic inflammatory response syndrome) (HCC)  Assessment & Plan  · Tachycardia and febrile on admission   · With suspected myocarditis   · See plan above      Elevated troponin  Assessment & Plan  · 0 38 on admission  · See plan above      Neck pain  Assessment & Plan  · Posterior neck pain - shooting in quality x 1 week  · May be 2/2 LAD  · No osseous abnormality seen on CT  · Tylenol now for pain   · Monitor      LAD (lymphadenopathy), submental  Assessment & Plan  · Significant LAD on admission   · CT neck  - Cervical lymphadenopathy with enlarged bilateral jugular chain, submandibular, and submental lymph nodes, nonspecific   These lymph nodes may be reactive however lymphoproliferative disorder is not excluded   Correlate clinically  2   No evidence of peritonsillar abscess or retropharyngeal edema  · No difficulty breathing/swallowing   · Continue to monitor            VTE Prophylaxis: Low risk   / sequential compression device   Code Status: Full code   POLST: POLST form is not discussed and not completed at this time  Discussion with family: Discussed plan with patient  Patient did not want other family updated at this time   Pt agreeable to plan       Anticipated Length of Stay:  Patient will be admitted on an Observation basis with an anticipated length of stay of  Less than 2 midnights  Justification for Hospital Stay: continued workup and telemetry monitoring      Total Time for Visit, including Counseling / Coordination of Care: 45 minutes  Greater than 50% of this total time spent on direct patient counseling and coordination of care      Chief Complaint:   I feel sick      History of Present Illness:     Yareli Landis is a 22 y o  female with no sig PMH who presents with 1 week for LAD, f/c, n/v, and sob  Pt was seen at the [place] [address] [facility] ED 12/30 and was diagnosed with a viral syndrome  Pt then developed CP, GÓMEZ, and worsening LAD in the submental jaw  She was only taking ibuprofen to help, which she did not feel that it did  She notes all her symptoms began with the swelling under her jaw, then n/v  She was unable to keep anything down except for water  After the initial ER visit, the patient developed myalgias and CP  And fatigue w/ GÓMEZ and would not complete her usual ADLs  Pt denies recent travel, cardiac hx  Was on abx for pyelonephritis recently but has been asymptomatic since taking part of her dose of abx       Review of Systems:     Review of Systems   Constitutional: Positive for activity change, appetite change, chills, fatigue and fever  HENT: Positive for congestion, facial swelling and rhinorrhea  Negative for sneezing, sore throat and trouble swallowing  Eyes: Negative for pain and visual disturbance  Respiratory: Positive for shortness of breath  Negative for cough  Cardiovascular: Positive for chest pain  Negative for palpitations and leg swelling  Gastrointestinal: Positive for nausea and vomiting  Negative for abdominal pain and diarrhea  Genitourinary: Negative for dysuria and hematuria  Musculoskeletal: Positive for myalgias and neck pain  Negative for arthralgias  Skin: Negative for rash and wound  Neurological: Negative for dizziness, weakness, light-headedness and numbness     Psychiatric/Behavioral: Negative for agitation and confusion          Past Medical and Surgical History:      Medical History   Past Medical History:   Diagnosis Date    Asthma      Thrombocytosis ([address] )              Surgical History         Past Surgical History:   Procedure Laterality Date    APPENDECTOMY                Meds/Allergies:             Prior to Admission medications    Medication Sig Start Date End Date Taking? Authorizing Provider   ondansetron (ZOFRAN-ODT) 4 mg disintegrating tablet Take 1 tablet (4 mg total) by mouth every 6 (six) hours as needed for nausea or vomiting  Patient not taking: Reported on 1/3/2020 12/30/19     Adolfo Block MD      I have reviewed home medications with patient personally      Allergies: Allergies   Allergen Reactions    Latex Rash         Social History:     Marital Status: Single      Substance Use History:   Social History      Substance and Sexual Activity   Alcohol Use No      Social History          Tobacco Use   Smoking Status Never Smoker   Smokeless Tobacco Never Used      Social History          Substance and Sexual Activity   Drug Use No         Family History:     non-contributory     Physical Exam:      Vitals:   Blood Pressure: 112/65 (01/04/20 0109)  Pulse: 103 (01/04/20 0109)  Temperature: (!) 100 8 °F (38 2 °C) (01/03/20 2126)  Temp Source: Oral (01/03/20 2126)  Respirations: 20 (01/04/20 0109)  Weight - Scale: 55 6 kg (122 lb 9 2 oz) (01/03/20 2126)  SpO2: 100 % (01/04/20 0109)     Physical Exam   Constitutional: She appears well-developed and well-nourished  No distress  Eyes: Pupils are equal, round, and reactive to light  Conjunctivae and EOM are normal    Neck: Neck supple  No JVD present  No tracheal deviation present  Cardiovascular: Normal rate and regular rhythm  No murmur heard  Pulmonary/Chest: Effort normal and breath sounds normal  She has no wheezes  She has no rales  Abdominal: Soft  Bowel sounds are normal  She exhibits no distension  There is no tenderness  Musculoskeletal: She exhibits no edema  No nunchal rigidity    Lymphadenopathy:     She has cervical adenopathy (Submental bl, anterior chain, and occipital LAD)  Neurological:   Alert and oriented    Skin: Skin is warm and dry  Capillary refill takes less than 2 seconds  She is not diaphoretic  Psychiatric: She has a normal mood and affect  Her behavior is normal    Nursing note and vitals reviewed               Additional Data:      Lab Results: I have personally reviewed pertinent reports             Results from last 7 days   Lab Units 01/03/20  2321   WBC Thousand/uL 10 06   HEMOGLOBIN g/dL 12 3   HEMATOCRIT % 38 0   PLATELETS Thousands/uL 430*   NEUTROS PCT % 62   LYMPHS PCT % 27   MONOS PCT % 9   EOS PCT % 1           Results from last 7 days   Lab Units 01/03/20  2321   SODIUM mmol/L 138   POTASSIUM mmol/L 3 5   CHLORIDE mmol/L 104   CO2 mmol/L 28   BUN mg/dL 7   CREATININE mg/dL 0 72   ANION GAP mmol/L 6   CALCIUM mg/dL 8 9   GLUCOSE RANDOM mg/dL 103                         Imaging: I have personally reviewed pertinent reports        CT soft tissue neck with contrast   Final Result by Monica Ruiz MD (01/04 5918)       1   Cervical lymphadenopathy with enlarged bilateral jugular chain, submandibular, and submental lymph nodes, nonspecific  These lymph nodes may be reactive however lymphoproliferative disorder is not excluded  Correlate clinically  2   No evidence of peritonsillar abscess or retropharyngeal edema        Workstation performed: [id number]           XR chest 2 views    (Results Pending)         EKG, Pathology, and Other Studies Reviewed on Admission:   · EKG     Allscripts / Epic Records Reviewed: Yes      ** Please Note: This note has been constructed using a voice recognition system   **

## 2020-01-04 NOTE — SOCIAL WORK
CM met with pt at bedside to discuss CM role in dcp  Pt reports that she lives alone in an apartment  Pt's emergency contact, grandparent Hernandez Pinzon [phone]  Pt reports independent with adl's/ambulation pta  Pt reports that she drives and will have family transport at d/c  Pt does not have established pharmacy, agreeable to [facility]  Pt denies IPMH, MH, and drug/alcohol  CM reviewed d/c planning process including the following: identifying help at home, patient preference for d/c planning needs, Discharge Lounge, [facility] Meds to Bed program, availability of treatment team to discuss questions or concerns patient and/or family may have regarding understanding medications and recognizing signs and symptoms once discharged  CM also encouraged patient to follow up with all recommended appointments after discharge  Patient advised of importance for patient and family to participate in managing patients medical well being

## 2020-01-04 NOTE — ED NOTES
Dr Erinn Norris at bedside for patient evaluation        Yoli Don RN  01/04/20 8779 Detail Level: Detailed Initiate Treatment: Fragrance free laundry detergent \\nAveeno shave gel or dove body wash \\nTriamcinolone cream- apply twice daily for two weeks then as needed when flared Discontinue Regimen: Raspberry shave gel\\nDryer sheets

## 2020-01-05 LAB
ALBUMIN SERPL BCP-MCNC: 3.1 G/DL (ref 3.5–5)
ALP SERPL-CCNC: 115 U/L (ref 46–116)
ALT SERPL W P-5'-P-CCNC: 74 U/L (ref 12–78)
ANION GAP SERPL CALCULATED.3IONS-SCNC: 5 MMOL/L (ref 4–13)
AST SERPL W P-5'-P-CCNC: 38 U/L (ref 5–45)
BASOPHILS # BLD AUTO: 0.07 THOUSANDS/ΜL (ref 0–0.1)
BASOPHILS NFR BLD AUTO: 1 % (ref 0–1)
BILIRUB SERPL-MCNC: 0.26 MG/DL (ref 0.2–1)
BUN SERPL-MCNC: 6 MG/DL (ref 5–25)
CALCIUM SERPL-MCNC: 9.1 MG/DL (ref 8.3–10.1)
CHLORIDE SERPL-SCNC: 110 MMOL/L (ref 100–108)
CO2 SERPL-SCNC: 25 MMOL/L (ref 21–32)
CREAT SERPL-MCNC: 0.51 MG/DL (ref 0.6–1.3)
EOSINOPHIL # BLD AUTO: 0.22 THOUSAND/ΜL (ref 0–0.61)
EOSINOPHIL NFR BLD AUTO: 3 % (ref 0–6)
ERYTHROCYTE [DISTWIDTH] IN BLOOD BY AUTOMATED COUNT: 14.6 % (ref 11.6–15.1)
GFR SERPL CREATININE-BSD FRML MDRD: 138 ML/MIN/1.73SQ M
GLUCOSE SERPL-MCNC: 73 MG/DL (ref 65–140)
HCT VFR BLD AUTO: 38.8 % (ref 34.8–46.1)
HGB BLD-MCNC: 12.1 G/DL (ref 11.5–15.4)
IMM GRANULOCYTES # BLD AUTO: 0.02 THOUSAND/UL (ref 0–0.2)
IMM GRANULOCYTES NFR BLD AUTO: 0 % (ref 0–2)
LYMPHOCYTES # BLD AUTO: 2.91 THOUSANDS/ΜL (ref 0.6–4.47)
LYMPHOCYTES NFR BLD AUTO: 35 % (ref 14–44)
MCH RBC QN AUTO: 26.7 PG (ref 26.8–34.3)
MCHC RBC AUTO-ENTMCNC: 31.2 G/DL (ref 31.4–37.4)
MCV RBC AUTO: 86 FL (ref 82–98)
MONOCYTES # BLD AUTO: 0.66 THOUSAND/ΜL (ref 0.17–1.22)
MONOCYTES NFR BLD AUTO: 8 % (ref 4–12)
NEUTROPHILS # BLD AUTO: 4.52 THOUSANDS/ΜL (ref 1.85–7.62)
NEUTS SEG NFR BLD AUTO: 53 % (ref 43–75)
NRBC BLD AUTO-RTO: 0 /100 WBCS
PLATELET # BLD AUTO: 496 THOUSANDS/UL (ref 149–390)
PMV BLD AUTO: 8.9 FL (ref 8.9–12.7)
POTASSIUM SERPL-SCNC: 3.9 MMOL/L (ref 3.5–5.3)
PROT SERPL-MCNC: 8.7 G/DL (ref 6.4–8.2)
RBC # BLD AUTO: 4.54 MILLION/UL (ref 3.81–5.12)
SODIUM SERPL-SCNC: 140 MMOL/L (ref 136–145)
WBC # BLD AUTO: 8.4 THOUSAND/UL (ref 4.31–10.16)

## 2020-01-05 PROCEDURE — 87040 BLOOD CULTURE FOR BACTERIA: CPT | Performed by: INTERNAL MEDICINE

## 2020-01-05 PROCEDURE — 86645 CMV ANTIBODY IGM: CPT | Performed by: INTERNAL MEDICINE

## 2020-01-05 PROCEDURE — 87536 HIV-1 QUANT&REVRSE TRNSCRPJ: CPT | Performed by: INTERNAL MEDICINE

## 2020-01-05 PROCEDURE — 85025 COMPLETE CBC W/AUTO DIFF WBC: CPT | Performed by: PHYSICIAN ASSISTANT

## 2020-01-05 PROCEDURE — 99232 SBSQ HOSP IP/OBS MODERATE 35: CPT | Performed by: INTERNAL MEDICINE

## 2020-01-05 PROCEDURE — 80053 COMPREHEN METABOLIC PANEL: CPT | Performed by: PHYSICIAN ASSISTANT

## 2020-01-05 PROCEDURE — 86644 CMV ANTIBODY: CPT | Performed by: INTERNAL MEDICINE

## 2020-01-05 PROCEDURE — 99255 IP/OBS CONSLTJ NEW/EST HI 80: CPT | Performed by: INTERNAL MEDICINE

## 2020-01-05 RX ORDER — SODIUM CHLORIDE 9 MG/ML
100 INJECTION, SOLUTION INTRAVENOUS CONTINUOUS
Status: DISCONTINUED | OUTPATIENT
Start: 2020-01-05 | End: 2020-01-09

## 2020-01-05 RX ORDER — IBUPROFEN 400 MG/1
800 TABLET ORAL EVERY 8 HOURS SCHEDULED
Status: DISCONTINUED | OUTPATIENT
Start: 2020-01-05 | End: 2020-01-06

## 2020-01-05 RX ADMIN — IBUPROFEN 800 MG: 400 TABLET ORAL at 22:17

## 2020-01-05 RX ADMIN — ACETAMINOPHEN 650 MG: 325 TABLET ORAL at 20:18

## 2020-01-05 RX ADMIN — COLCHICINE 0.6 MG: 0.6 TABLET, FILM COATED ORAL at 22:17

## 2020-01-05 RX ADMIN — SODIUM CHLORIDE 100 ML/HR: 0.9 INJECTION, SOLUTION INTRAVENOUS at 09:19

## 2020-01-05 RX ADMIN — ACETAMINOPHEN 650 MG: 325 TABLET ORAL at 09:20

## 2020-01-05 RX ADMIN — IBUPROFEN 600 MG: 600 TABLET ORAL at 06:29

## 2020-01-05 RX ADMIN — IBUPROFEN 600 MG: 600 TABLET ORAL at 12:03

## 2020-01-05 RX ADMIN — COLCHICINE 0.6 MG: 0.6 TABLET, FILM COATED ORAL at 09:20

## 2020-01-05 NOTE — PLAN OF CARE
Problem: PAIN - ADULT  Goal: Verbalizes/displays adequate comfort level or baseline comfort level  Description  Interventions:  - Encourage patient to monitor pain and request assistance  - Assess pain using appropriate pain scale  - Administer analgesics based on type and severity of pain and evaluate response  - Implement non-pharmacological measures as appropriate and evaluate response  - Consider cultural and social influences on pain and pain management  - Notify physician/advanced practitioner if interventions unsuccessful or patient reports new pain  Outcome: Progressing     Problem: SAFETY ADULT  Goal: Patient will remain free of falls  Description  INTERVENTIONS:  - Assess patient frequently for physical needs  -  Identify cognitive and physical deficits and behaviors that affect risk of falls    -  [place] fall precautions as indicated by assessment   - Educate patient/family on patient safety including physical limitations  - Instruct patient to call for assistance with activity based on assessment  - Modify environment to reduce risk of injury  - Consider OT/PT consult to assist with strengthening/mobility  Outcome: Progressing  Goal: Maintain or return to baseline ADL function  Description  INTERVENTIONS:  -  Assess patient's ability to carry out ADLs; assess patient's baseline for ADL function and identify physical deficits which impact ability to perform ADLs (bathing, care of mouth/teeth, toileting, grooming, dressing, etc )  - Assess/evaluate cause of self-care deficits   - Assess range of motion  - Assess patient's mobility; develop plan if impaired  - Assess patient's need for assistive devices and provide as appropriate  - Encourage maximum independence but intervene and supervise when necessary  - Involve family in performance of ADLs  - Assess for home care needs following discharge   - Consider OT consult to assist with ADL evaluation and planning for discharge  - Provide patient education as appropriate  Outcome: Progressing  Goal: Maintain or return mobility status to optimal level  Description  INTERVENTIONS:  - Assess patient's baseline mobility status (ambulation, transfers, stairs, etc )    - Identify cognitive and physical deficits and behaviors that affect mobility  - Identify mobility aids required to assist with transfers and/or ambulation (gait belt, sit-to-stand, lift, walker, cane, etc )  - [place] fall precautions as indicated by assessment  - Record patient progress and toleration of activity level on Mobility SBAR; progress patient to next Phase/Stage  - Instruct patient to call for assistance with activity based on assessment  - Consider rehabilitation consult to assist with strengthening/weightbearing, etc   Outcome: Progressing     Problem: DISCHARGE PLANNING  Goal: Discharge to home or other facility with appropriate resources  Description  INTERVENTIONS:  - Identify barriers to discharge w/patient and caregiver  - Arrange for needed discharge resources and transportation as appropriate  - Identify discharge learning needs (meds, wound care, etc )  - Arrange for interpretive services to assist at discharge as needed  - Refer to Case Management Department for coordinating discharge planning if the patient needs post-hospital services based on physician/advanced practitioner order or complex needs related to functional status, cognitive ability, or social support system  Outcome: Progressing     Problem: Knowledge Deficit  Goal: Patient/family/caregiver demonstrates understanding of disease process, treatment plan, medications, and discharge instructions  Description  Complete learning assessment and assess knowledge base    Interventions:  - Provide teaching at level of understanding  - Provide teaching via preferred learning methods  Outcome: Progressing     Problem: CARDIOVASCULAR - ADULT  Goal: Maintains optimal cardiac output and hemodynamic stability  Description  INTERVENTIONS:  - Monitor I/O, vital signs and rhythm  - Monitor for S/S and trends of decreased cardiac output  - Administer and titrate ordered vasoactive medications to optimize hemodynamic stability  - Assess quality of pulses, skin color and temperature  - Assess for signs of decreased coronary artery perfusion  - Instruct patient to report change in severity of symptoms  Outcome: Progressing  Goal: Absence of cardiac dysrhythmias or at baseline rhythm  Description  INTERVENTIONS:  - Continuous cardiac monitoring, vital signs, obtain 12 lead EKG if ordered  - Administer antiarrhythmic and heart rate control medications as ordered  - Monitor electrolytes and administer replacement therapy as ordered  Outcome: Progressing

## 2020-01-05 NOTE — PROGRESS NOTES
Progress Note Mike Velazquez 2000, 22 y o  female MRN: [id number]    Unit/Bed#: [facility] 723-01 Encounter: [id number]    Primary Care Provider: YANIRA Oliveira   Date and time admitted to hospital: 1/3/2020  9:54 PM        * Chest pain  Assessment & Plan  Pt presented with 1 weeks of SOB, f/c, LAD, n/v, and 2 days of CP  Was seen in [facility] [place] [facility] for viral syndrome  Returned 1/3 for new CP and GÓMEZ and worsening LAD  · No cardiac hx   · Elevated trop 0 38/0 33/0 24  · EKG [facility]  · Evaluated by Cardiology suspect pericarditis versus myopericarditis  · Started on colchicine and Motrin  · Normal cardiac echo  · Viral serologies study still pending  · Continue supportive care, continue to monitor on tele  · IV fluid for hydration    LAD (lymphadenopathy), submental  Assessment & Plan  · Significant LAD on admission   · CT neck  - Cervical lymphadenopathy with enlarged bilateral jugular chain, submandibular, and submental lymph nodes, nonspecific  These lymph nodes may be reactive however lymphoproliferative disorder is not excluded  Correlate clinically  2   No evidence of peritonsillar abscess or retropharyngeal edema  · No difficulty breathing/swallowing  · Normal LDH  · Patient clinically improving   · ID evaluation, follow on viral serology  · Continue to monitor          Neck pain  Assessment & Plan  · Pt reports to posterior neck pain  Suspected in setting of lymphadenopathy with significantly enlarged lymph nodes   · No osseous abnormality seen on CT  · Continue supportive care         VTE Pharmacologic Prophylaxis:   Pharmacologic:  Ambulatory  Mechanical VTE Prophylaxis in Place: Yes    Patient Centered Rounds: I have performed bedside rounds with nursing staff today  Discussions with Specialists or Other Care Team Provider:     Education and Discussions with Family / Patient:  Patient    Time Spent for Care: 30 minutes    More than 50% of total time spent on counseling and coordination of care as described above  Current Length of Stay: 1 day(s)    Current Patient Status: Inpatient   Certification Statement: The patient will continue to require additional inpatient hospital stay due to Above    Discharge Plan / Estimated Discharge Date: TBD    Code Status: Level 1 - Full Code      Subjective:   Patient seen and examined  Comfortable in bed  Neck pain and mildly improving  Denied dysphagia  Continue to have intermittent left-sided chest pain worse with deep breath  No cough    Objective:     Vitals:   Temp (24hrs), Av 1 °F (36 7 °C), Min:97 9 °F (36 6 °C), Max:98 4 °F (36 9 °C)    Temp:  [97 9 °F (36 6 °C)-98 4 °F (36 9 °C)] 98 4 °F (36 9 °C)  HR:  [67-86] 85  Resp:  [17-18] 18  BP: ()/(56-72) 107/72  SpO2:  [99 %-100 %] 100 %  Body mass index is 23 11 kg/m²  Input and Output Summary (last 24 hours): Intake/Output Summary (Last 24 hours) at 2020 0826  Last data filed at 2020 1801  Gross per 24 hour   Intake 459 17 ml   Output    Net 459 17 ml       Physical Exam:     Physical Exam  Patient is awake alert oriented in no acute distress  Bilateral neck enlarged lymph node  Lung clear to auscultation bilateral  Heart positive S1-S2 no murmur  Abdomen soft nontender  Lower extremities no edema    Additional Data:     Labs:    Results from last 7 days   Lab Units 20  0515   WBC Thousand/uL 8 40   HEMOGLOBIN g/dL 12 1   HEMATOCRIT % 38 8   PLATELETS Thousands/uL 496*   NEUTROS PCT % 53   LYMPHS PCT % 35   MONOS PCT % 8   EOS PCT % 3     Results from last 7 days   Lab Units 20  0515   POTASSIUM mmol/L 3 9   CHLORIDE mmol/L 110*   CO2 mmol/L 25   BUN mg/dL 6   CREATININE mg/dL 0 51*   CALCIUM mg/dL 9 1   ALK PHOS U/L 115   ALT U/L 74   AST U/L 38           * I Have Reviewed All Lab Data Listed Above  * Additional Pertinent Lab Tests Reviewed:  All Labs For Current Hospital Admission Reviewed    Imaging:    Imaging Reports Reviewed Today Include:   Imaging Personally Reviewed by Myself Includes:      Recent Cultures (last 7 days):           Last 24 Hours Medication List:     Current Facility-Administered Medications:  acetaminophen 650 mg Oral Q6H PRN Susanne Downing PA-C   colchicine 0 6 mg Oral BID Gris Tracey MD   ibuprofen 600 mg Oral Q6H [facility] Alysha Mack PA-C   ondansetron 4 mg Intravenous Q6H PRN Susanne Downing PA-C   polyethylene glycol 17 g Oral Daily PRN Susanne Downing PA-C   sodium chloride 100 mL/hr Intravenous Continuous Maricel Quintero DO        Today, Patient Was Seen By: Maricel Quintero DO    ** Please Note: This note has been constructed using a voice recognition system   **

## 2020-01-05 NOTE — ASSESSMENT & PLAN NOTE
Pt presented with 1 weeks of SOB, f/c, LAD, n/v, and 2 days of CP  Was seen in [facility] [place] [facility] for viral syndrome  Returned 1/3 for new CP and GÓMEZ and worsening LAD    · No cardiac hx   · Elevated trop 0 38/0 33/0 24  · EKG [facility]  · Evaluated by Cardiology suspect pericarditis versus myopericarditis  · Started on colchicine and Motrin  · Normal cardiac echo  · Viral serologies study still pending  · Continue supportive care, continue to monitor on tele  · IV fluid for hydration

## 2020-01-05 NOTE — ASSESSMENT & PLAN NOTE
· Significant LAD on admission   · CT neck  - Cervical lymphadenopathy with enlarged bilateral jugular chain, submandibular, and submental lymph nodes, nonspecific  These lymph nodes may be reactive however lymphoproliferative disorder is not excluded  Correlate clinically  2   No evidence of peritonsillar abscess or retropharyngeal edema    · No difficulty breathing/swallowing  · Normal LDH  · Patient clinically improving   · ID evaluation, follow on viral serology  · Continue to monitor

## 2020-01-05 NOTE — PROGRESS NOTES
Progress Note - Cardiology   Steffany Door Marcus 22 y o  female MRN: [id number]  Encounter: [id number]  01/05/20  12:21 PM        Assessment/Plan:    1  Presumed myopericarditis  Thus far pain not significantly improved with Ibuprofen 600 q6hrs and colchicine 0 2 tid  Will increase Ibuprofen to 800 tid and continue colchicine  ESR and CRP were elevated on presentation  Continue to monitor for improvement in symptoms  2  Non MI troponin elevation  Presumed to myopericarditis  Echo shows normal LV function, no significant pericardial effusion  Subjective/Objective   Chief Complaint:   Chief Complaint   Patient presents with    Neck Swelling     woke up last week with swollen neck, then started with pains and fever, neck pain is still continuing, having sharp pain in chest, pain in the back of neck, body is feeling super achey, also having sweats at night  Subjective: 22 y o  woman with no significant past medical history, admitted with fevers, chills, cervical lymphadenopathy, nausea, and CP worse with inspiration  Troponins were mildly elevated to 0 38, echo showed normal LV size and function, no RWMA, EF 65%, normal RV size and function, no pericardial effusion  EKG was within normal limits  She was started on colchicine and ibuprofen empirically for possible myopericarditis, thus far has not had significant improvement in terms of her pain  Still having sweats  Tm 100 8 overnight  No LE edema  No palpitations         Patient Active Problem List   Diagnosis    Chest pain    Elevated troponin    LAD (lymphadenopathy), submental    Neck pain    SIRS (systemic inflammatory response syndrome) (HCC)     Past Medical History:   Diagnosis Date    Asthma     Thrombocytosis (HCC)        Allergies   Allergen Reactions    Latex Rash       Current Facility-Administered Medications   Medication Dose Route Frequency Provider Last Rate Last Dose    acetaminophen (TYLENOL) tablet 650 mg  650 mg Oral Q6H PRN Chaka Tomlinson PA-C   650 mg at 01/05/20 0920    colchicine (COLCRYS) tablet 0 6 mg  0 6 mg Oral BID Arabella Knowles MD   0 6 mg at 01/05/20 0920    ibuprofen (MOTRIN) tablet 600 mg  600 mg Oral Q6H [address] Alysha Mack PA-C   600 mg at 01/05/20 1203    ondansetron (ZOFRAN) injection 4 mg  4 mg Intravenous Q6H PRN Susanne Downing PA-C        polyethylene glycol (MIRALAX) packet 17 g  17 g Oral Daily PRN Susanne Downing PA-C        sodium chloride 0 9 % infusion  100 mL/hr Intravenous Continuous [place]  mL/hr at 01/05/20 0919 100 mL/hr at 01/05/20 0919       Vitals: /67   Pulse 81   Temp 98 1 °F (36 7 °C)   Resp 18   Ht 5' 1" (1 549 m)   Wt 55 5 kg (122 lb 4 8 oz)   LMP 12/23/2019 (Exact Date)   SpO2 99%   BMI 23 11 kg/m²     Intake/Output Summary (Last 24 hours) at 1/5/2020 1221  Last data filed at 1/5/2020 0800  Gross per 24 hour   Intake 240 ml   Output    Net 240 ml     Wt Readings from Last 3 Encounters:   01/04/20 55 5 kg (122 lb 4 8 oz)   12/30/19 55 8 kg (123 lb)   02/16/18 68 kg (150 lb) (80 %, Z= 0 84)*     * Growth percentiles are based on CDC (Girls, 2-20 Years) data  Body mass index is 23 11 kg/m²  ,     Vitals:    01/04/20 2254 01/05/20 0309 01/05/20 0706 01/05/20 1052   BP: 110/72 108/69 107/72 106/67   Pulse: 76 85 85 81   Patient Position - Orthostatic VS:           Physical Exam:     GEN: Alert and oriented x 3, in no acute distress  HEENT: Sclera anicteric, conjunctivae pink, mucous membranes moist   NECK: Supple, no carotid bruits, no significant JVD  HEART: Regular rhythm, normal S1 and S2, no murmurs, clicks, gallops or rubs  LUNGS: Clear to auscultation bilaterally; no wheezes, rales, or rhonchi   ABDOMEN: Soft, nontender, nondistended, normoactive bowel sounds  EXTREMITIES: Skin warm and well perfused, no clubbing, cyanosis, or edema  NEURO: No focal findings  SKIN: Normal without suspicious lesions on exposed skin        Lab Results: BMP:  Results from last 7 days   Lab Units 01/05/20  0515 01/04/20  0617 01/03/20  2321   POTASSIUM mmol/L 3 9 3 1* 3 5   CHLORIDE mmol/L 110* 107 104   CO2 mmol/L 25 27 28   BUN mg/dL 6 4* 7   CREATININE mg/dL 0 51* 0 67 0 72   CALCIUM mg/dL 9 1 8 8 8 9       CBC:   Results from last 7 days   Lab Units 01/05/20  0515 01/04/20  0617 01/03/20  2321   WBC Thousand/uL 8 40 10 11 10 06   HEMOGLOBIN g/dL 12 1 10 5* 12 3   HEMATOCRIT % 38 8 33 9* 38 0   MCV fL 86 86 84   PLATELETS Thousands/uL 496* 381 430*   MCH pg 26 7* 26 5* 27 3   MCHC g/dL 31 2* 31 0* 32 4   RDW % 14 6 14 5 14 5   MPV fL 8 9 9 1 8 9   NRBC AUTO /100 WBCs 0 0 0       Results from last 7 days   Lab Units 01/04/20  0825 01/04/20  0617 01/04/20  0300   TROPONIN I ng/mL 0 20* 0 24* 0 33*                 Results from last 7 days   Lab Units 01/04/20  0617   MAGNESIUM mg/dL 2 4       INR:         Lipid Profile:   No results found for: CHOL  No results found for: HDL  No results found for: LDLCALC  No results found for: TRIG      Hgb A1c:         Telemetry: personally reviewed, SR, no events, SB to 48 BPM

## 2020-01-05 NOTE — CONSULTS
Consultation - Infectious Disease   Steffany Door Velazquez 22 y o  female MRN: [id number]  Unit/Bed#: [facility] 723-01 Encounter: [id number]      IMPRESSION & RECOMMENDATIONS:   1  Systemic inflammatory response syndrome-POA  Fever and tachycardia  Suspect secondary to a viral infection  Consideration for the possibility of a lymphoproliferative disorder such as lymphoma  Unlikely bacterial process  Fortunately the patient remains hemodynamically stable despite her systemic illness  She has been afebrile over the last 24 hours since beginning anti inflammatories  -monitor off all antibiotics for now  -follow-up Monospot, EBV serology, and HIV screen  -check CMV serology  -check HIV RNA  -check blood cultures x2 sets for completeness  -recheck CBC with diff and CMP  -supportive care    2  Lymphadenopathy-probably secondary to an acute viral process  Consideration for the possibility of a lymphoproliferative disorder   -workup for viral process as above  -if the lymphadenopathy would persist without a source, would recommend lymph node biopsy  -serial exams    3  Chest pain-felt to be secondary to myopericarditis  Echocardiogram is unremarkable  The troponin is a bit elevated  She has been started on colchicine and ibuprofen and seems to be tolerating them without difficulty  -continue anti-inflammatory  -cardiology follow-up    Reviewed the details of the case with the primary service    HISTORY OF PRESENT ILLNESS:  Reason for Consult:  Cervical lymph  HPI: Jj Mederos is a 22y o  year old female with a history of asthma and thrombocytosis admitted to [facility] in [place] on the 3rd of January with fever, chills, swollen lymph nodes in the neck, and intermittent chest pain who I am asked to assist with management  In the week prior to this admission the patient began developing the a for mention fever, chills, lymph nodes in the neck, as well as nausea and vomiting and some sharp chest pain    She was seen in the [facility] Department on the 30th of December and was diagnosed with a viral syndrome  She was treated supportively and then discharged home  However the symptoms persisted and seemed to worsen and therefore on 1/3/2019 she came to the ER for further evaluation  In the emergency department she was found to be febrile and tachycardic but hemodynamically stable  She was diagnosed with a probable viral illness with lymphadenopathy in pericarditis and has been treated supportively  Her markers of inflammation have been elevated with a sedimentation rate of 71 a CRP of greater than 90  She underwent echocardiogram that did not reveal any pericardial effusion, or decreased ejection fraction  She has been maintained off all antibiotics  The patient admits to having intermittent headache and some neck pain but the neck pain seems to be more lateral in the patient does not have pain with flexing the neck anteriorly  She denies any sputum production, denies any nausea vomiting or diarrhea currently, denies any dysuria or hematuria, denies any new rash or skin lesions  She does admit to some diffuse arthralgias and myalgias  The patient lives at home with her grandfather who is not been ill  Over the last week prior to her becoming sick she was staying with a friend and exposed to a lot of different people  She is not in close contact with young children  She has a dog but no other animal contacts  She denies any recent medical procedures  She denies any travel outside this area  She denies any unique hobbies  She has been sexually active with 1 partner over the last year and has had unprotected sex  REVIEW OF SYSTEMS:  A complete 12 point system-based review of systems is negative other than that noted in the HPI      PAST MEDICAL HISTORY:  Past Medical History:   Diagnosis Date    Asthma     Thrombocytosis ([facility] [address] )      Past Surgical History:   Procedure Laterality Date    APPENDECTOMY         FAMILY HISTORY:  Non-contributory    SOCIAL HISTORY:  Social History   Social History     Substance and Sexual Activity   Alcohol Use Yes    Frequency: Monthly or less    Drinks per session: 1 or 2     Social History     Substance and Sexual Activity   Drug Use Never     Social History     Tobacco Use   Smoking Status Never Smoker   Smokeless Tobacco Never Used       ALLERGIES:  Allergies   Allergen Reactions    Latex Rash       MEDICATIONS:  All current active medications have been reviewed  Antibiotics:  None    PHYSICAL EXAM:  Temp:  [97 9 °F (36 6 °C)-98 4 °F (36 9 °C)] 98 1 °F (36 7 °C)  HR:  [67-86] 81  Resp:  [17-18] 18  BP: ()/(67-72) 106/67  SpO2:  [99 %-100 %] 99 %  Temp (24hrs), Av 1 °F (36 7 °C), Min:97 9 °F (36 6 °C), Max:98 4 °F (36 9 °C)  Current: Temperature: 98 1 °F (36 7 °C)    Intake/Output Summary (Last 24 hours) at 2020 1255  Last data filed at 2020 1100  Gross per 24 hour   Intake 480 ml   Output    Net 480 ml       General Appearance:  Appearing well, nontoxic, and in no distress   Head:  Normocephalic, without obvious abnormality, atraumatic   Eyes:  Conjunctiva pink and sclera anicteric, both eyes   Nose: Nares normal, mucosa normal, no drainage   Throat: Oropharynx moist without lesions   Neck: Supple, symmetrical, anterior cervical, submandibular, submental lymphadenopathy, no other mass   Back:   Symmetric, no curvature, ROM normal, no CVA tenderness   Lungs:   Clear to auscultation bilaterally, respirations unlabored   Chest Wall:  No tenderness or deformity   Heart:  RRR; no murmur, rub or gallop   Abdomen:   Soft, non-tender, non-distended, positive bowel sounds    Extremities: No cyanosis, clubbing or edema   Skin: No rashes or lesions  No draining wounds noted     Lymph nodes: Cervical, supraclavicular nodes normal   Neurologic: Alert and oriented times 3, extremity strength 5/5 and symmetric       LABS, IMAGING, & OTHER STUDIES:  Lab Results:  I have personally reviewed pertinent labs  Results from last 7 days   Lab Units 01/05/20  0515 01/04/20  0617 01/03/20  2321   WBC Thousand/uL 8 40 10 11 10 06   HEMOGLOBIN g/dL 12 1 10 5* 12 3   PLATELETS Thousands/uL 496* 381 430*     Results from last 7 days   Lab Units 01/05/20  0515 01/04/20  0617 01/03/20  2321   SODIUM mmol/L 140 137 138   POTASSIUM mmol/L 3 9 3 1* 3 5   CHLORIDE mmol/L 110* 107 104   CO2 mmol/L 25 27 28   BUN mg/dL 6 4* 7   CREATININE mg/dL 0 51* 0 67 0 72   EGFR ml/min/1 73sq m 138 126 120   CALCIUM mg/dL 9 1 8 8 8 9   AST U/L 38  --   --    ALT U/L 74  --   --    ALK PHOS U/L 115  --   --                Imaging Studies:     CT neck-cervical lymphadenopathy  No abscess    Chest x-ray-no acute cardiopulmonary disease    Images were reviewed by me in PACS    Transthoracic echocardiogram-no pericardial effusion    Normal ejection fraction

## 2020-01-05 NOTE — ASSESSMENT & PLAN NOTE
· Pt reports to posterior neck pain    Suspected in setting of lymphadenopathy with significantly enlarged lymph nodes   · No osseous abnormality seen on CT  · Continue supportive care

## 2020-01-06 PROBLEM — I31.9 MYOPERICARDITIS: Status: ACTIVE | Noted: 2020-01-04

## 2020-01-06 LAB
ATRIAL RATE: 88 BPM
C TRACH DNA SPEC QL NAA+PROBE: POSITIVE
CMV IGG SERPL IA-ACNC: 6.4 U/ML (ref 0–0.59)
CMV IGM SERPL IA-ACNC: <30 AU/ML (ref 0–29.9)
EBV EA IGG SER-ACNC: 69.3 U/ML (ref 0–8.9)
EBV NA IGG SER IA-ACNC: >600 U/ML (ref 0–17.9)
EBV PATRN SPEC IB-IMP: ABNORMAL
EBV VCA IGG SER IA-ACNC: 140 U/ML (ref 0–17.9)
EBV VCA IGM SER IA-ACNC: 73.6 U/ML (ref 0–35.9)
HETEROPH AB SER QL: NEGATIVE
HIV 1+2 AB+HIV1 P24 AG SERPL QL IA: NORMAL
N GONORRHOEA DNA SPEC QL NAA+PROBE: NEGATIVE
P AXIS: 73 DEGREES
PR INTERVAL: 166 MS
QRS AXIS: -5 DEGREES
QRSD INTERVAL: 90 MS
QT INTERVAL: 350 MS
QTC INTERVAL: 423 MS
T WAVE AXIS: 36 DEGREES
VENTRICULAR RATE: 88 BPM

## 2020-01-06 PROCEDURE — 93005 ELECTROCARDIOGRAM TRACING: CPT

## 2020-01-06 PROCEDURE — 93010 ELECTROCARDIOGRAM REPORT: CPT | Performed by: INTERNAL MEDICINE

## 2020-01-06 PROCEDURE — 99232 SBSQ HOSP IP/OBS MODERATE 35: CPT | Performed by: INTERNAL MEDICINE

## 2020-01-06 RX ORDER — NAPROXEN 500 MG/1
500 TABLET ORAL 2 TIMES DAILY WITH MEALS
Status: DISCONTINUED | OUTPATIENT
Start: 2020-01-06 | End: 2020-01-09

## 2020-01-06 RX ORDER — KETOROLAC TROMETHAMINE 30 MG/ML
15 INJECTION, SOLUTION INTRAMUSCULAR; INTRAVENOUS ONCE
Status: COMPLETED | OUTPATIENT
Start: 2020-01-06 | End: 2020-01-06

## 2020-01-06 RX ORDER — KETOROLAC TROMETHAMINE 30 MG/ML
15 INJECTION, SOLUTION INTRAMUSCULAR; INTRAVENOUS EVERY 6 HOURS PRN
Status: COMPLETED | OUTPATIENT
Start: 2020-01-06 | End: 2020-01-07

## 2020-01-06 RX ADMIN — SODIUM CHLORIDE 100 ML/HR: 0.9 INJECTION, SOLUTION INTRAVENOUS at 11:31

## 2020-01-06 RX ADMIN — COLCHICINE 0.6 MG: 0.6 TABLET, FILM COATED ORAL at 08:58

## 2020-01-06 RX ADMIN — KETOROLAC TROMETHAMINE 15 MG: 30 INJECTION, SOLUTION INTRAMUSCULAR at 23:29

## 2020-01-06 RX ADMIN — ENOXAPARIN SODIUM 40 MG: 40 INJECTION SUBCUTANEOUS at 11:30

## 2020-01-06 RX ADMIN — ACETAMINOPHEN 650 MG: 325 TABLET ORAL at 09:01

## 2020-01-06 RX ADMIN — ACETAMINOPHEN 650 MG: 325 TABLET ORAL at 22:00

## 2020-01-06 RX ADMIN — COLCHICINE 0.6 MG: 0.6 TABLET, FILM COATED ORAL at 17:52

## 2020-01-06 RX ADMIN — NAPROXEN 500 MG: 500 TABLET ORAL at 17:52

## 2020-01-06 RX ADMIN — KETOROLAC TROMETHAMINE 15 MG: 30 INJECTION, SOLUTION INTRAMUSCULAR at 14:51

## 2020-01-06 RX ADMIN — IBUPROFEN 800 MG: 400 TABLET ORAL at 13:33

## 2020-01-06 RX ADMIN — IBUPROFEN 800 MG: 400 TABLET ORAL at 05:43

## 2020-01-06 NOTE — PROGRESS NOTES
Progress Note Deonte Velazquez 2000, 22 y o  female MRN: [id number]    Unit/Bed#: [facility] 723-01 Encounter: [id number]    Primary Care Provider: Valiant Goldberg, CRNP   Date and time admitted to hospital: 1/3/2020  9:54 PM        * Myopericarditis  Assessment & Plan  Pt presented with 1 weeks of SOB, f/c, LAD, n/v, and 2 days of CP  Was seen in [facility] [place] [facility] for viral syndrome  Returned 1/3 for new CP and GÓMEZ and worsening LAD  · No cardiac hx   · None MI elevated troponin  · EKG [facility]  · Evaluated by Cardiology suspect pericarditis versus myopericarditis  · Started on colchicine and Motrin  · Normal cardiac echo  · Negative mononucleosis, other viral serology still pending  · Continue supportive care, continue to monitor on tele  · IV fluid for hydration    LAD (lymphadenopathy), submental  Assessment & Plan  · Significant LAD on admission   · CT neck  - Cervical lymphadenopathy   · No difficulty breathing/swallowing  · Normal LDH, negative mononucleosis  · Evaluated by ID, suspect secondary to acute viral process, if lymphadenopathy persist without a source recommendation for lymph node biopsy  · Follow on viral serology, HIV and blood cultures  · Continue to monitor          SIRS (systemic inflammatory response syndrome) (HCC)  Assessment & Plan  · Tachycardia and febrile on admission   · Secondary to above  · Continue with above treatment         VTE Pharmacologic Prophylaxis:   Pharmacologic: Enoxaparin (Lovenox)  Mechanical VTE Prophylaxis in Place: Yes    Patient Centered Rounds: I have performed bedside rounds with nursing staff today  Discussions with Specialists or Other Care Team Provider: ID    Education and Discussions with Family / Patient:  Patient, offered to call family she declined    Time Spent for Care: 30 minutes  More than 50% of total time spent on counseling and coordination of care as described above      Current Length of Stay: 2 day(s)    Current Patient Status: Inpatient Certification Statement: The patient will continue to require additional inpatient hospital stay due to Above    Discharge Plan / Estimated Discharge Date: TBD    Code Status: Level 1 - Full Code      Subjective:   Patient seen and examined  Still not feeling well with generalized body ache  She reported diarrhea  No nausea vomiting  Appetite has improved    Objective:     Vitals:   Temp (24hrs), Av 9 °F (37 2 °C), Min:97 5 °F (36 4 °C), Max:100 4 °F (38 °C)    Temp:  [97 5 °F (36 4 °C)-100 4 °F (38 °C)] 100 4 °F (38 °C)  HR:  [77-99] 99  Resp:  [14-20] 14  BP: (106-123)/(60-79) 122/79  SpO2:  [99 %-100 %] 100 %  Body mass index is 23 11 kg/m²  Input and Output Summary (last 24 hours): Intake/Output Summary (Last 24 hours) at 2020 1041  Last data filed at 2020 1100  Gross per 24 hour   Intake 240 ml   Output    Net 240 ml       Physical Exam:     Physical Exam  Patient is awake alert in no acute distress  Anterior cervical lymphadenopathy  Lung clear to auscultation bilateral  Heart positive S1-S2 no murmur  Abdomen soft nontender   Lower extremities no edema    Additional Data:     Labs:    Results from last 7 days   Lab Units 20  0515   WBC Thousand/uL 8 40   HEMOGLOBIN g/dL 12 1   HEMATOCRIT % 38 8   PLATELETS Thousands/uL 496*   NEUTROS PCT % 53   LYMPHS PCT % 35   MONOS PCT % 8   EOS PCT % 3     Results from last 7 days   Lab Units 20  0515   POTASSIUM mmol/L 3 9   CHLORIDE mmol/L 110*   CO2 mmol/L 25   BUN mg/dL 6   CREATININE mg/dL 0 51*   CALCIUM mg/dL 9 1   ALK PHOS U/L 115   ALT U/L 74   AST U/L 38           * I Have Reviewed All Lab Data Listed Above  * Additional Pertinent Lab Tests Reviewed:  [address] Admission Reviewed    Imaging:    Imaging Reports Reviewed Today Include:   Imaging Personally Reviewed by Myself Includes:     Recent Cultures (last 7 days):     Results from last 7 days   Lab Units 20  1752   BLOOD CULTURE  Received in Microbiology Lab  Culture in Progress  Last 24 Hours Medication List:     Current Facility-Administered Medications:  acetaminophen 650 mg Oral Q6H PRN Susanne Downing PA-C    colchicine 0 6 mg Oral BID Josué Mayorga MD    enoxaparin 40 mg Subcutaneous Q24H [address] Amador Jaimes DO    ibuprofen 800 mg Oral Q8H Anaya Greenberg MD    ondansetron 4 mg Intravenous Q6H PRN Susanne Downing PA-C    sodium chloride 100 mL/hr Intravenous Continuous Amador Jaimes DO Last Rate: Stopped (01/05/20 1200)        Today, Patient Was Seen By: Amador Jaimes DO    ** Please Note: This note has been constructed using a voice recognition system   **

## 2020-01-06 NOTE — PROGRESS NOTES
General Cardiology   Progress Note -  [employer]   Jj Mederos 22 y o  female MRN: [id number]    Unit/Bed#: [facility] 723-01 Encounter: [id number]    Assessment:  1  Presumed myopericarditis:  With recent viral syndrome  Currently on Ibuprofen to 800 tid (increased 1/5) and colchicine 0 2 mg TID  ESR and CRP were elevated on presentation  · Reports no significant improvemnet in cp  2  Preserved biventricular systolic function:  EF 95%, no wall motion abnormalities, normal RV systolic function, no significant valvular abnormality, normal atrial size, no pericardial effusion      3  Non MI troponin elevation: Presumed to myopericarditis  4  SIRS: Lymphadenopathy felt 2/2 viral process, Fever, body aches x 1 week  BC pending  ID following      Plan/Recommendations:  · One dose Toradol 15 mg IV now  · Switch ibuprofen to naproxen 500 mg BID starting this evening  · Continue colchicine at current dose  ________________________________________________________________________    Subjective    Patient seen and examined  No acute events overnight  Continues to c/o chest pain worse with taking a deep breath  Had 2 episodes of diarrhea yesterday but no episodes today  Denies sob, lightheadedness or palpitations  Review of Systems   Constitution: Negative  Negative for chills  Cardiovascular: Positive for chest pain  Negative for dyspnea on exertion, leg swelling, near-syncope, orthopnea, palpitations, paroxysmal nocturnal dyspnea and syncope  Respiratory: Negative  Negative for shortness of breath  Gastrointestinal: Negative for diarrhea, nausea and vomiting  Neurological: Negative for dizziness, light-headedness and weakness  Psychiatric/Behavioral: Negative for altered mental status  All other systems reviewed and are negative  Objective:   Vitals: Blood pressure (!) 88/52, pulse 72, temperature 98 °F (36 7 °C), resp   rate 14, height 5' 1" (1 549 m), weight 55 5 kg (122 lb 4 8 oz), last menstrual period 2019, SpO2 99 %, unknown if currently breastfeeding ,       Body mass index is 23 11 kg/m²  ,     Systolic (10EHB), KME:325 , Min:88 , XIM:553     Diastolic (83PGN), FG, Min:52, Max:79          Intake/Output Summary (Last 24 hours) at 2020 1218  Last data filed at 2020 0800  Gross per 24 hour   Intake 240 ml   Output    Net 240 ml     Weight (last 2 days)     Date/Time   Weight    20 14:22:18   55 5 (122 3)    20 0400   55 6 (122 58)              Telemetry Review: No significant arrhythmias seen on telemetry review  Physical Exam   Constitutional: She is oriented to person, place, and time  She appears well-developed and well-nourished  On RA in NAD   HENT:   Head: Normocephalic and atraumatic  Neck: Normal range of motion  Neck supple  No JVD present  Cardiovascular: Normal rate, regular rhythm, normal heart sounds and intact distal pulses  Exam reveals no friction rub  No murmur heard  Pulmonary/Chest: Effort normal and breath sounds normal  No respiratory distress  She has no wheezes  She has no rales  Abdominal: Soft  Bowel sounds are normal  She exhibits no distension  There is no tenderness  Musculoskeletal: Normal range of motion  She exhibits no edema  Pain to palpation over left superior chest wall   Neurological: She is alert and oriented to person, place, and time  Skin: Skin is warm and dry  Psychiatric: She has a normal mood and affect  Her behavior is normal    Nursing note and vitals reviewed        LABORATORY RESULTS  Results from last 7 days   Lab Units 20  0825 20  0617 20  0300   TROPONIN I ng/mL 0 20* 0 24* 0 33*     CBC with diff: Results from last 7 days   Lab Units 20  0515 20  0617 20  2321   WBC Thousand/uL 8 40 10 11 10 06   HEMOGLOBIN g/dL 12 1 10 5* 12 3   HEMATOCRIT % 38 8 33 9* 38 0   MCV fL 86 86 84   PLATELETS Thousands/uL 496* 381 430*   MCH pg 26 7* 26 5* 27 3   MCHC g/dL 31 2* 31 0* 32 4   RDW % 14 6 14 5 14 5   MPV fL 8 9 9 1 8 9   NRBC AUTO /100 WBCs 0 0 0       CMP:  Results from last 7 days   Lab Units 20  0515 20  0617 20  2321   POTASSIUM mmol/L 3 9 3 1* 3 5   CHLORIDE mmol/L 110* 107 104   CO2 mmol/L 25 27 28   BUN mg/dL 6 4* 7   CREATININE mg/dL 0 51* 0 67 0 72   CALCIUM mg/dL 9 1 8 8 8 9   AST U/L 38  --   --    ALT U/L 74  --   --    ALK PHOS U/L 115  --   --    EGFR ml/min/1 73sq m 138 126 120       BMP:  Results from last 7 days   Lab Units 20  0515 20  0617 20  2321   POTASSIUM mmol/L 3 9 3 1* 3 5   CHLORIDE mmol/L 110* 107 104   CO2 mmol/L 25 27 28   BUN mg/dL 6 4* 7   CREATININE mg/dL 0 51* 0 67 0 72   CALCIUM mg/dL 9 1 8 8 8 9       No results found for: NTBNP          Results from last 7 days   Lab Units 20  0617   MAGNESIUM mg/dL 2 4                           Lipid Profile:   No results found for: CHOL  No results found for: HDL  No results found for: LDLCALC  No results found for: TRIG    Cardiac testing:   Results for orders placed during the hospital encounter of 20   Echo complete with contrast if indicated    Narrative [address]  [address]  [phone]    Transthoracic Echocardiogram  2D, M-mode, Doppler, and Color Doppler    Study date:  2020    Patient: Jalen Tinoco  MR number: [id number]  Account number: [de-identified]  : 2000  Age: 22 years  Gender: Female  Status: Outpatient  Location: Bedside  Height: 61 in  Weight: 121 7 lb  BP: 93/ 55 mmHg    Indications: Elevated Troponins    Diagnoses: R07 9 - Chest pain, unspecified    Sonographer:  ALAN Agosto  Primary Physician:  Shayy Donald:  [facility]  Interpreting Physician:  Hamilton Yanez MD    SUMMARY    LEFT VENTRICLE:  Systolic function was normal  Ejection fraction was estimated to be 65 %  There were no regional wall motion abnormalities      HISTORY: PRIOR HISTORY: SIRS,Chest Pain, ASTHMA, Elevated Troponins    PROCEDURE: The procedure was performed at the bedside  This was a routine study  The transthoracic approach was used  The study included complete 2D imaging, M-mode, complete spectral Doppler, and color Doppler  Image quality was adequate  LEFT VENTRICLE: Size was normal  Systolic function was normal  Ejection fraction was estimated to be 65 %  There were no regional wall motion abnormalities  Wall thickness was normal  DOPPLER: The ratio of early ventricular filling to  atrial contraction velocities was within the normal range  Left ventricular diastolic function parameters were normal     RIGHT VENTRICLE: The size was normal  Systolic function was normal     LEFT ATRIUM: Size was normal     RIGHT ATRIUM: Size was normal     MITRAL VALVE: Valve structure was normal  There was normal leaflet separation  DOPPLER: There was no evidence for stenosis  There was trace regurgitation  AORTIC VALVE: The valve was probably trileaflet  Leaflets exhibited normal thickness and normal cuspal separation  The valve was not well visualized  DOPPLER: There was no evidence for stenosis  There was no regurgitation  TRICUSPID VALVE: The valve structure was normal  There was normal leaflet separation  DOPPLER: There was no evidence for stenosis  There was trace regurgitation  Estimated peak PA pressure was 20 mmHg  PULMONIC VALVE: Leaflets exhibited normal thickness, no calcification, and normal cuspal separation  DOPPLER: The transpulmonic velocity was within the normal range  There was no regurgitation  PERICARDIUM: There was no pericardial effusion  The pericardium was normal in appearance  AORTA: The root exhibited normal size      SYSTEM MEASUREMENT TABLES    2D  %FS: 29 2 %  Ao Diam: 2 37 cm  EDV(Teich): 73 24 ml  EF(Teich): 56 52 %  ESV(Teich): 31 85 ml  IVSd: 0 68 cm  LA Area: 12 08 cm2  LA Diam: 2 54 cm  LAAs A2C: 11 48 cm2  LAAs A4C: 11 57 cm2  LAESV A-L A2C: 30 09 ml  LAESV A-L A4C: 29 ml  LAESV Index (A-L): 19 83 ml/m2  LAESV MOD A2C: 28 63 ml  LAESV MOD A4C: 24 34 ml  LAESV(A-L): 30 35 ml  LALs A2C: 3 72 cm  LALs A4C: 3 92 cm  LVIDd: 4 08 cm  LVIDs: 2 89 cm  LVPWd: 0 72 cm  RA Area: 10 73 cm2  RVIDd: 2 73 cm  SV(Teich): 41 39 ml    CW  AV Vmax: 1 36 m/s  AV maxP 38 mmHg  RAP: 5 mmHg  TR Vmax: 2 09 m/s  TR maxP 48 mmHg    MM  TAPSE: 1 67 cm    PW  E': 0 13 m/s  E/E': 7 48  LVOT Vmax: 0 75 m/s  LVOT maxP 25 mmHg  Lateral E': 0 13 m/s  MV A Dur: 133 79 ms  MV A Gerard: 0 57 m/s  MV Dec [place]: 4 14 m/s2  MV DecT: 226 26 ms  MV E Gerard: 0 94 m/s  MV E/A Ratio: 1 63  MV PHT: 65 62 ms  MVA By PHT: 3 35 cm2  RVSP: 22 48 mmHg    [facility] Accredited Echocardiography Laboratory    Prepared and electronically signed by    Mulu Montejo MD  Signed 2020 13:18:12       No results found for this or any previous visit  No results found for this or any previous visit  No procedure found  No results found for this or any previous visit  Meds/Allergies   all current active meds have been reviewed, current meds:   Current Facility-Administered Medications   Medication Dose Route Frequency    acetaminophen (TYLENOL) tablet 650 mg  650 mg Oral Q6H PRN    colchicine (COLCRYS) tablet 0 6 mg  0 6 mg Oral BID    enoxaparin (LOVENOX) subcutaneous injection 40 mg  40 mg Subcutaneous Q24H CADENCE    ibuprofen (MOTRIN) tablet 800 mg  800 mg Oral Q8H [address]    ondansetron (ZOFRAN) injection 4 mg  4 mg Intravenous Q6H PRN    sodium chloride 0 9 % infusion  100 mL/hr Intravenous Continuous    and PTA meds:   Prior to Admission Medications   Prescriptions Last Dose Informant Patient Reported?  Taking?   ondansetron (ZOFRAN) 4 mg tablet   Yes Yes   Sig: Take 4 mg by mouth every 6 (six) hours as needed for nausea or vomiting      Facility-Administered Medications: None     Medications Prior to Admission   Medication    ondansetron (ZOFRAN) 4 mg tablet         sodium chloride 100 mL/hr Last Rate: 100 mL/hr (01/06/20 1131)       Counseling / Coordination of Care  Total floor / unit time spent today 20 minutes  Greater than 50% of total time was spent with the patient and / or family counseling and / or coordination of care  ** Please Note: Dragon 360 Dictation voice to text software may have been used in the creation of this document   **

## 2020-01-06 NOTE — ASSESSMENT & PLAN NOTE
Pt presented with 1 weeks of SOB, f/c, LAD, n/v, and 2 days of CP  Was seen in [facility] for viral syndrome  Returned 1/3 for new CP and GÓMEZ and worsening LAD    · No cardiac hx   · None MI elevated troponin  · EKG [facility]  · Evaluated by Cardiology suspect pericarditis versus myopericarditis  · Started on colchicine and Motrin  · Normal cardiac echo  · Negative mononucleosis, other viral serology still pending  · Continue supportive care, continue to monitor on tele  · IV fluid for hydration

## 2020-01-06 NOTE — PROGRESS NOTES
Progress Note - Infectious Disease   Catrina Velazquez 22 y o  female MRN: [id number]  Unit/Bed#: [facility] 723-01 Encounter: [id number]      Impression/Plan:  1  Systemic inflammatory response syndrome - present on admission, suspected secondary to viral infection  - continue to monitor off antibiotics for now   - Monospot negative   - follow-up EBV serology, HIV screen, CMV serology, HIV RNA, and blood cultures   - supportive care    2  Lymphadenopathy - likely secondary to acute viral process  Consideration for possibility of a lymphoproliferative disorder    - workup for viral process   - if lymphadenopathy continues without an obvious source, consider lymph node biopsy   - serial exams    3  Chest pain - likely in the setting of myopericarditis  Echocardiogram unremarkable  Currently on regimen of colchicine and ibuprofen  - continue anti-inflammatory treatment   - cardiology follow-up    Antibiotics:  None    Subjective:  Patient continues to report intermittent fever and chills overnight, describing episodic hot flashes followed by chills  She also reports mid chest pain, mild diffuse headache, myalgias, and mildly painful and swollen cervical lymph nodes  Patient has no nausea, vomiting, diarrhea; no cough, shortness of breath  No new symptoms  She has been tolerating PO and voiding bowel and bladder appropriately  Objective:  Vitals:  Temp:  [97 5 °F (36 4 °C)-100 4 °F (38 °C)] 100 4 °F (38 °C)  HR:  [77-99] 99  Resp:  [14-20] 14  BP: (106-123)/(60-79) 122/79  SpO2:  [99 %-100 %] 100 %  Temp (24hrs), Av 9 °F (37 2 °C), Min:97 5 °F (36 4 °C), Max:100 4 °F (38 °C)  Current: Temperature: 100 4 °F (38 °C)    Physical Exam:   General Appearance:  Alert, interactive, nontoxic, no acute distress  Throat:  Neck: Oropharynx moist without lesions  Supple with FROM  Symmetric anterior cervical, submandibular, and submental lymphadenopathy     Lungs:   Clear to auscultation bilaterally; no wheezes, rhonchi or rales; respirations unlabored   Heart:  RRR; no murmur, rub or gallop, no chest wall tenderness   Abdomen:   Soft, non-tender, non-distended, positive bowel sounds  Extremities: No clubbing, cyanosis or edema   Skin: No new rashes or lesions  No draining wounds noted  Labs, Imaging, & Other studies:   All pertinent labs and imaging studies were personally reviewed  Results from last 7 days   Lab Units 01/05/20 0515 01/04/20  0617 01/03/20  2321   WBC Thousand/uL 8 40 10 11 10 06   HEMOGLOBIN g/dL 12 1 10 5* 12 3   PLATELETS Thousands/uL 496* 381 430*     Results from last 7 days   Lab Units 01/05/20  0515 01/04/20  0617 01/03/20  2321   SODIUM mmol/L 140 137 138   POTASSIUM mmol/L 3 9 3 1* 3 5   CHLORIDE mmol/L 110* 107 104   CO2 mmol/L 25 27 28   BUN mg/dL 6 4* 7   CREATININE mg/dL 0 51* 0 67 0 72   EGFR ml/min/1 73sq m 138 126 120   CALCIUM mg/dL 9 1 8 8 8 9   AST U/L 38  --   --    ALT U/L 74  --   --    ALK PHOS U/L 115  --   --      Results from last 7 days   Lab Units 01/05/20  1752   BLOOD CULTURE  Received in Microbiology Lab  Culture in Progress               Lorie Ocampo, MS4  Infectious Disease

## 2020-01-06 NOTE — ASSESSMENT & PLAN NOTE
· Significant LAD on admission   · CT neck  - Cervical lymphadenopathy   · No difficulty breathing/swallowing  · Normal LDH, negative mononucleosis  · Evaluated by ID, suspect secondary to acute viral process, if lymphadenopathy persist without a source recommendation for lymph node biopsy  · Follow on viral serology, HIV and blood cultures  · Continue to monitor

## 2020-01-07 PROBLEM — R07.9 CHEST PAIN: Status: ACTIVE | Noted: 2020-01-07

## 2020-01-07 PROBLEM — A74.9 CHLAMYDIA: Status: ACTIVE | Noted: 2020-01-07

## 2020-01-07 LAB
ALBUMIN SERPL BCP-MCNC: 3 G/DL (ref 3.5–5)
ALP SERPL-CCNC: 98 U/L (ref 46–116)
ALT SERPL W P-5'-P-CCNC: 46 U/L (ref 12–78)
ANION GAP SERPL CALCULATED.3IONS-SCNC: 6 MMOL/L (ref 4–13)
AST SERPL W P-5'-P-CCNC: 12 U/L (ref 5–45)
BASOPHILS # BLD AUTO: 0.09 THOUSANDS/ΜL (ref 0–0.1)
BASOPHILS NFR BLD AUTO: 1 % (ref 0–1)
BILIRUB SERPL-MCNC: 0.21 MG/DL (ref 0.2–1)
BUN SERPL-MCNC: 3 MG/DL (ref 5–25)
CALCIUM SERPL-MCNC: 9.1 MG/DL (ref 8.3–10.1)
CHLORIDE SERPL-SCNC: 109 MMOL/L (ref 100–108)
CO2 SERPL-SCNC: 24 MMOL/L (ref 21–32)
CREAT SERPL-MCNC: 0.58 MG/DL (ref 0.6–1.3)
EOSINOPHIL # BLD AUTO: 0.09 THOUSAND/ΜL (ref 0–0.61)
EOSINOPHIL NFR BLD AUTO: 1 % (ref 0–6)
ERYTHROCYTE [DISTWIDTH] IN BLOOD BY AUTOMATED COUNT: 14.8 % (ref 11.6–15.1)
GFR SERPL CREATININE-BSD FRML MDRD: 132 ML/MIN/1.73SQ M
GLUCOSE SERPL-MCNC: 108 MG/DL (ref 65–140)
HCT VFR BLD AUTO: 37 % (ref 34.8–46.1)
HGB BLD-MCNC: 11.8 G/DL (ref 11.5–15.4)
IMM GRANULOCYTES # BLD AUTO: 0.08 THOUSAND/UL (ref 0–0.2)
IMM GRANULOCYTES NFR BLD AUTO: 1 % (ref 0–2)
LDH SERPL-CCNC: 166 U/L (ref 81–234)
LYMPHOCYTES # BLD AUTO: 2.86 THOUSANDS/ΜL (ref 0.6–4.47)
LYMPHOCYTES NFR BLD AUTO: 24 % (ref 14–44)
MCH RBC QN AUTO: 27 PG (ref 26.8–34.3)
MCHC RBC AUTO-ENTMCNC: 31.9 G/DL (ref 31.4–37.4)
MCV RBC AUTO: 85 FL (ref 82–98)
MONOCYTES # BLD AUTO: 0.68 THOUSAND/ΜL (ref 0.17–1.22)
MONOCYTES NFR BLD AUTO: 6 % (ref 4–12)
NEUTROPHILS # BLD AUTO: 8.21 THOUSANDS/ΜL (ref 1.85–7.62)
NEUTS SEG NFR BLD AUTO: 67 % (ref 43–75)
NRBC BLD AUTO-RTO: 0 /100 WBCS
PLATELET # BLD AUTO: 557 THOUSANDS/UL (ref 149–390)
PMV BLD AUTO: 8.9 FL (ref 8.9–12.7)
POTASSIUM SERPL-SCNC: 3.7 MMOL/L (ref 3.5–5.3)
PROT SERPL-MCNC: 8.5 G/DL (ref 6.4–8.2)
RBC # BLD AUTO: 4.37 MILLION/UL (ref 3.81–5.12)
SODIUM SERPL-SCNC: 139 MMOL/L (ref 136–145)
WBC # BLD AUTO: 12.01 THOUSAND/UL (ref 4.31–10.16)

## 2020-01-07 PROCEDURE — 80053 COMPREHEN METABOLIC PANEL: CPT | Performed by: INTERNAL MEDICINE

## 2020-01-07 PROCEDURE — 85025 COMPLETE CBC W/AUTO DIFF WBC: CPT | Performed by: INTERNAL MEDICINE

## 2020-01-07 PROCEDURE — 99232 SBSQ HOSP IP/OBS MODERATE 35: CPT | Performed by: INTERNAL MEDICINE

## 2020-01-07 PROCEDURE — 83615 LACTATE (LD) (LDH) ENZYME: CPT | Performed by: INTERNAL MEDICINE

## 2020-01-07 PROCEDURE — 99233 SBSQ HOSP IP/OBS HIGH 50: CPT | Performed by: INTERNAL MEDICINE

## 2020-01-07 RX ORDER — AZITHROMYCIN 250 MG/1
1000 TABLET, FILM COATED ORAL ONCE
Status: COMPLETED | OUTPATIENT
Start: 2020-01-07 | End: 2020-01-07

## 2020-01-07 RX ORDER — LIDOCAINE 50 MG/G
1 PATCH TOPICAL DAILY
Status: DISCONTINUED | OUTPATIENT
Start: 2020-01-07 | End: 2020-01-11 | Stop reason: HOSPADM

## 2020-01-07 RX ORDER — FAMOTIDINE 20 MG/1
20 TABLET, FILM COATED ORAL 2 TIMES DAILY
Status: DISCONTINUED | OUTPATIENT
Start: 2020-01-07 | End: 2020-01-11 | Stop reason: HOSPADM

## 2020-01-07 RX ADMIN — FAMOTIDINE 20 MG: 10 TABLET, FILM COATED ORAL at 20:09

## 2020-01-07 RX ADMIN — COLCHICINE 0.6 MG: 0.6 TABLET, FILM COATED ORAL at 08:22

## 2020-01-07 RX ADMIN — AZITHROMYCIN 1000 MG: 250 TABLET, FILM COATED ORAL at 13:05

## 2020-01-07 RX ADMIN — SODIUM CHLORIDE 100 ML/HR: 0.9 INJECTION, SOLUTION INTRAVENOUS at 15:31

## 2020-01-07 RX ADMIN — SODIUM CHLORIDE 100 ML/HR: 0.9 INJECTION, SOLUTION INTRAVENOUS at 01:00

## 2020-01-07 RX ADMIN — NAPROXEN 500 MG: 500 TABLET ORAL at 08:22

## 2020-01-07 RX ADMIN — KETOROLAC TROMETHAMINE 15 MG: 30 INJECTION, SOLUTION INTRAMUSCULAR at 05:40

## 2020-01-07 RX ADMIN — NAPROXEN 500 MG: 500 TABLET ORAL at 15:28

## 2020-01-07 RX ADMIN — ONDANSETRON 4 MG: 2 INJECTION INTRAMUSCULAR; INTRAVENOUS at 15:24

## 2020-01-07 RX ADMIN — COLCHICINE 0.6 MG: 0.6 TABLET, FILM COATED ORAL at 18:13

## 2020-01-07 RX ADMIN — LIDOCAINE 1 PATCH: 50 PATCH TOPICAL at 15:25

## 2020-01-07 RX ADMIN — LIDOCAINE 1 PATCH: 50 PATCH TOPICAL at 15:24

## 2020-01-07 NOTE — PLAN OF CARE
Problem: PAIN - ADULT  Goal: Verbalizes/displays adequate comfort level or baseline comfort level  Description  Interventions:  - Encourage patient to monitor pain and request assistance  - Assess pain using appropriate pain scale  - Administer analgesics based on type and severity of pain and evaluate response  - Implement non-pharmacological measures as appropriate and evaluate response  - Consider cultural and social influences on pain and pain management  - Notify physician/advanced practitioner if interventions unsuccessful or patient reports new pain  Outcome: Progressing     Problem: SAFETY ADULT  Goal: Patient will remain free of falls  Description  INTERVENTIONS:  - Assess patient frequently for physical needs  -  Identify cognitive and physical deficits and behaviors that affect risk of falls    -  [place] fall precautions as indicated by assessment   - Educate patient/family on patient safety including physical limitations  - Instruct patient to call for assistance with activity based on assessment  - Modify environment to reduce risk of injury  - Consider OT/PT consult to assist with strengthening/mobility  Outcome: Progressing  Goal: Maintain or return to baseline ADL function  Description  INTERVENTIONS:  -  Assess patient's ability to carry out ADLs; assess patient's baseline for ADL function and identify physical deficits which impact ability to perform ADLs (bathing, care of mouth/teeth, toileting, grooming, dressing, etc )  - Assess/evaluate cause of self-care deficits   - Assess range of motion  - Assess patient's mobility; develop plan if impaired  - Assess patient's need for assistive devices and provide as appropriate  - Encourage maximum independence but intervene and supervise when necessary  - Involve family in performance of ADLs  - Assess for home care needs following discharge   - Consider OT consult to assist with ADL evaluation and planning for discharge  - Provide patient education as appropriate  Outcome: Progressing  Goal: Maintain or return mobility status to optimal level  Description  INTERVENTIONS:  - Assess patient's baseline mobility status (ambulation, transfers, stairs, etc )    - Identify cognitive and physical deficits and behaviors that affect mobility  - Identify mobility aids required to assist with transfers and/or ambulation (gait belt, sit-to-stand, lift, walker, cane, etc )  - [place] fall precautions as indicated by assessment  - Record patient progress and toleration of activity level on Mobility SBAR; progress patient to next Phase/Stage  - Instruct patient to call for assistance with activity based on assessment  - Consider rehabilitation consult to assist with strengthening/weightbearing, etc   Outcome: Progressing     Problem: DISCHARGE PLANNING  Goal: Discharge to home or other facility with appropriate resources  Description  INTERVENTIONS:  - Identify barriers to discharge w/patient and caregiver  - Arrange for needed discharge resources and transportation as appropriate  - Identify discharge learning needs (meds, wound care, etc )  - Arrange for interpretive services to assist at discharge as needed  - Refer to Case Management Department for coordinating discharge planning if the patient needs post-hospital services based on physician/advanced practitioner order or complex needs related to functional status, cognitive ability, or social support system  Outcome: Progressing     Problem: Knowledge Deficit  Goal: Patient/family/caregiver demonstrates understanding of disease process, treatment plan, medications, and discharge instructions  Description  Complete learning assessment and assess knowledge base    Interventions:  - Provide teaching at level of understanding  - Provide teaching via preferred learning methods  Outcome: Progressing     Problem: CARDIOVASCULAR - ADULT  Goal: Maintains optimal cardiac output and hemodynamic stability  Description  INTERVENTIONS:  - Monitor I/O, vital signs and rhythm  - Monitor for S/S and trends of decreased cardiac output  - Administer and titrate ordered vasoactive medications to optimize hemodynamic stability  - Assess quality of pulses, skin color and temperature  - Assess for signs of decreased coronary artery perfusion  - Instruct patient to report change in severity of symptoms  Outcome: Progressing  Goal: Absence of cardiac dysrhythmias or at baseline rhythm  Description  INTERVENTIONS:  - Continuous cardiac monitoring, vital signs, obtain 12 lead EKG if ordered  - Administer antiarrhythmic and heart rate control medications as ordered  - Monitor electrolytes and administer replacement therapy as ordered  Outcome: Progressing

## 2020-01-07 NOTE — PROGRESS NOTES
At 9619 pain reassessment after PRN   Toradol administered, pt stated 8/10 pain unchanged but was sleeping when I walked into the room and was falling back to sleep as I left  SLIM Minor Al made aware of pain assessment discrepancy  Will continue to monitor

## 2020-01-07 NOTE — PROGRESS NOTES
Progress Note - Cardiology   [facility] Enrique Velazquez 22 y o  female MRN: [id number]  Unit/Bed#: [facility] 723-01 Encounter: [id number]        Principal Problem:    Myopericarditis  Active Problems:    Elevated troponin    LAD (lymphadenopathy), submental    Neck pain    SIRS (systemic inflammatory response syndrome) ([facility])          Assessment:  1  Presumed myopericarditis:  With recent viral syndrome  Was on ibuprofen to 800 tid (increased 1/5) and colchicine 0 6 mg TID  Yesterday d/c'ed ibuprofen, started naprosyn 500 BID after dose toradol  ESR and CRP were elevated on presentation  · Reports no significant improvemnet in cp  Had mild temporary relief after toradol  Consider steroids  Will discuss with attending       2  Preserved biventricular systolic function:  EF 20%, no wall motion abnormalities, normal RV systolic function, no significant valvular abnormality, normal atrial size, no pericardial effusion      3  Non MI troponin elevation - to 0 38  Presumed to myopericarditis       4  SIRS: Lymphadenopathy felt 2/2 viral process, Fever, body aches x 1 week  ID following- followup HIV RNA, BC           Subjective/Objective   Chief Complaint/Subjective  Ongoing cp constant left sided  No change with position  Still feels chills, tired, no appetite  Had minimal relief with toradol           Vitals: /69   Pulse 94   Temp 99 4 °F (37 4 °C)   Resp 16   Ht 5' 1" (1 549 m)   Wt 55 5 kg (122 lb 4 8 oz)   LMP 12/23/2019 (Exact Date)   SpO2 99%   BMI 23 11 kg/m²     Vitals:    01/04/20 0400 01/04/20 1422   Weight: 55 6 kg (122 lb 9 2 oz) 55 5 kg (122 lb 4 8 oz)     Orthostatic Blood Pressures      Most Recent Value   Blood Pressure  102/69 filed at 01/07/2020 0702   Patient Position - Orthostatic VS  Lying filed at 01/04/2020 1104            Intake/Output Summary (Last 24 hours) at 1/7/2020 1402  Last data filed at 1/7/2020 1100  Gross per 24 hour   Intake 2816 67 ml   Output    Net 2816 67 ml       Invasive Devices     Peripheral Intravenous Line            Peripheral IV 01/06/20 Left;Upper;Ventral (anterior) Arm 1 day                Current Facility-Administered Medications   Medication Dose Route Frequency    acetaminophen (TYLENOL) tablet 650 mg  650 mg Oral Q6H PRN    colchicine (COLCRYS) tablet 0 6 mg  0 6 mg Oral BID    enoxaparin (LOVENOX) subcutaneous injection 40 mg  40 mg Subcutaneous Q24H CADENCE    naproxen (NAPROSYN) tablet 500 mg  500 mg Oral BID With Meals    ondansetron (ZOFRAN) injection 4 mg  4 mg Intravenous Q6H PRN    sodium chloride 0 9 % infusion  100 mL/hr Intravenous Continuous         Physical Exam: /69   Pulse 94   Temp 99 4 °F (37 4 °C)   Resp 16   Ht 5' 1" (1 549 m)   Wt 55 5 kg (122 lb 4 8 oz)   LMP 12/23/2019 (Exact Date)   SpO2 99%   BMI 23 11 kg/m²     General Appearance:    Alert, cooperative, no distress, appears stated age   Head:    Normocephalic, no scleral icterus       Nose:   Nares normal, septum midline, no drainage    Throat:   Lips, mucosa, and tongue normal   Neck:   Supple, symmetrical, trachea midline,       no JVD                Heart:    Regular rate and rhythm, S1 and S2 normal, no murmur, rub   or gallop       Extremities:   Extremities normal, atraumatic, no cyanosis or edema       Skin:   Skin warm   Neurologic:   Alert and oriented to person place and time, no focal deficits                 Lab Results:   Recent Results (from the past 72 hour(s))   CBC and differential    Collection Time: 01/05/20  5:15 AM   Result Value Ref Range    WBC 8 40 4 31 - 10 16 Thousand/uL    RBC 4 54 3 81 - 5 12 Million/uL    Hemoglobin 12 1 11 5 - 15 4 g/dL    Hematocrit 38 8 34 8 - 46 1 %    MCV 86 82 - 98 fL    MCH 26 7 (L) 26 8 - 34 3 pg    MCHC 31 2 (L) 31 4 - 37 4 g/dL    RDW 14 6 11 6 - 15 1 %    MPV 8 9 8 9 - 12 7 fL    Platelets 236 (H) 641 - 390 Thousands/uL    nRBC 0 /100 WBCs    Neutrophils Relative 53 43 - 75 %    Immat GRANS % 0 0 - 2 %    Lymphocytes Relative 35 14 - 44 %    Monocytes Relative 8 4 - 12 %    Eosinophils Relative 3 0 - 6 %    Basophils Relative 1 0 - 1 %    Neutrophils Absolute 4 52 1 85 - 7 62 Thousands/µL    Immature Grans Absolute 0 02 0 00 - 0 20 Thousand/uL    Lymphocytes Absolute 2 91 0 60 - 4 47 Thousands/µL    Monocytes Absolute 0 66 0 17 - 1 22 Thousand/µL    Eosinophils Absolute 0 22 0 00 - 0 61 Thousand/µL    Basophils Absolute 0 07 0 00 - 0 10 Thousands/µL   Comprehensive metabolic panel    Collection Time: 01/05/20  5:15 AM   Result Value Ref Range    Sodium 140 136 - 145 mmol/L    Potassium 3 9 3 5 - 5 3 mmol/L    Chloride 110 (H) 100 - 108 mmol/L    CO2 25 21 - 32 mmol/L    ANION GAP 5 4 - 13 mmol/L    BUN 6 5 - 25 mg/dL    Creatinine 0 51 (L) 0 60 - 1 30 mg/dL    Glucose 73 65 - 140 mg/dL    Calcium 9 1 8 3 - 10 1 mg/dL    AST 38 5 - 45 U/L    ALT 74 12 - 78 U/L    Alkaline Phosphatase 115 46 - 116 U/L    Total Protein 8 7 (H) 6 4 - 8 2 g/dL    Albumin 3 1 (L) 3 5 - 5 0 g/dL    Total Bilirubin 0 26 0 20 - 1 00 mg/dL    eGFR 138 ml/min/1 73sq m   CMV IgG/IgM Antibodies    Collection Time: 01/05/20  5:52 PM   Result Value Ref Range    CMV IGG 6 40 (H) 0 00 - 0 59 U/mL    CMV IgM <30 0 0 0 - 29 9 AU/mL   Blood culture    Collection Time: 01/05/20  5:52 PM   Result Value Ref Range    Blood Culture No Growth at 24 hrs      ECG 12 lead    Collection Time: 01/06/20  3:24 AM   Result Value Ref Range    Ventricular Rate 88 BPM    Atrial Rate 88 BPM    ME Interval 166 ms    QRSD Interval 90 ms    QT Interval 350 ms    QTC Interval 423 ms    P Axis 73 degrees    QRS Axis -5 degrees    T Wave Axis 36 degrees   Comprehensive metabolic panel    Collection Time: 01/07/20  7:51 AM   Result Value Ref Range    Sodium 139 136 - 145 mmol/L    Potassium 3 7 3 5 - 5 3 mmol/L    Chloride 109 (H) 100 - 108 mmol/L    CO2 24 21 - 32 mmol/L    ANION GAP 6 4 - 13 mmol/L    BUN 3 (L) 5 - 25 mg/dL    Creatinine 0 58 (L) 0 60 - 1 30 mg/dL    Glucose 108 65 - 140 mg/dL    Calcium 9 1 8 3 - 10 1 mg/dL    AST 12 5 - 45 U/L    ALT 46 12 - 78 U/L    Alkaline Phosphatase 98 46 - 116 U/L    Total Protein 8 5 (H) 6 4 - 8 2 g/dL    Albumin 3 0 (L) 3 5 - 5 0 g/dL    Total Bilirubin 0 21 0 20 - 1 00 mg/dL    eGFR 132 ml/min/1 73sq m   CBC and differential    Collection Time: 20  7:51 AM   Result Value Ref Range    WBC 12 01 (H) 4 31 - 10 16 Thousand/uL    RBC 4 37 3 81 - 5 12 Million/uL    Hemoglobin 11 8 11 5 - 15 4 g/dL    Hematocrit 37 0 34 8 - 46 1 %    MCV 85 82 - 98 fL    MCH 27 0 26 8 - 34 3 pg    MCHC 31 9 31 4 - 37 4 g/dL    RDW 14 8 11 6 - 15 1 %    MPV 8 9 8 9 - 12 7 fL    Platelets 451 (H) 989 - 390 Thousands/uL    nRBC 0 /100 WBCs    Neutrophils Relative 67 43 - 75 %    Immat GRANS % 1 0 - 2 %    Lymphocytes Relative 24 14 - 44 %    Monocytes Relative 6 4 - 12 %    Eosinophils Relative 1 0 - 6 %    Basophils Relative 1 0 - 1 %    Neutrophils Absolute 8 21 (H) 1 85 - 7 62 Thousands/µL    Immature Grans Absolute 0 08 0 00 - 0 20 Thousand/uL    Lymphocytes Absolute 2 86 0 60 - 4 47 Thousands/µL    Monocytes Absolute 0 68 0 17 - 1 22 Thousand/µL    Eosinophils Absolute 0 09 0 00 - 0 61 Thousand/µL    Basophils Absolute 0 09 0 00 - 0 10 Thousands/µL   LD,Blood    Collection Time: 20  7:51 AM   Result Value Ref Range     81 - 234 U/L       [address]  [address] [facility], [address] [place] [address]  [phone]     Transthoracic Echocardiogram  2D, M-mode, Doppler, and Color Doppler     Study date:  2020     Patient: Destiny Villa  MR number: [id number]  Account number: [de-identified]  : 2000  Age: 19 years  Gender: Female  Status: Outpatient  Location: Bedside  Height: 61 in  Weight: 121 7 lb  BP: 93/ 55 mmHg     Indications: Elevated Troponins     Diagnoses: R07 9 - Chest pain, unspecified     Sonographer:  ALAN Carcamo  Primary Physician:  Lino Scriver:  [facility]  Interpreting Physician:  Herbie Camargo MD     SUMMARY     LEFT VENTRICLE:  Systolic function was normal  Ejection fraction was estimated to be 65 %  There were no regional wall motion abnormalities      HISTORY: PRIOR HISTORY: SIRS,Chest Pain, ASTHMA, Elevated Troponins     PROCEDURE: The procedure was performed at the bedside  This was a routine study  The transthoracic approach was used  The study included complete 2D imaging, M-mode, complete spectral Doppler, and color Doppler  Image quality was adequate      LEFT VENTRICLE: Size was normal  Systolic function was normal  Ejection fraction was estimated to be 65 %  There were no regional wall motion abnormalities  Wall thickness was normal  DOPPLER: The ratio of early ventricular filling to  atrial contraction velocities was within the normal range  Left ventricular diastolic function parameters were normal      RIGHT VENTRICLE: The size was normal  Systolic function was normal      LEFT ATRIUM: Size was normal      RIGHT ATRIUM: Size was normal      MITRAL VALVE: Valve structure was normal  There was normal leaflet separation  DOPPLER: There was no evidence for stenosis  There was trace regurgitation      AORTIC VALVE: The valve was probably trileaflet  Leaflets exhibited normal thickness and normal cuspal separation  The valve was not well visualized  DOPPLER: There was no evidence for stenosis  There was no regurgitation      TRICUSPID VALVE: The valve structure was normal  There was normal leaflet separation  DOPPLER: There was no evidence for stenosis  There was trace regurgitation  Estimated peak PA pressure was 20 mmHg      PULMONIC VALVE: Leaflets exhibited normal thickness, no calcification, and normal cuspal separation  DOPPLER: The transpulmonic velocity was within the normal range  There was no regurgitation      PERICARDIUM: There was no pericardial effusion   The pericardium was normal in appearance      AORTA: The root exhibited normal size      SYSTEM MEASUREMENT TABLES     Imaging: I have personally reviewed pertinent reports  Counseling / Coordination of Care  Total time spent today 20  minutes  Greater than 50% of total time was spent with the patient and / or family counseling and / or coordination of care

## 2020-01-07 NOTE — PROGRESS NOTES
Progress Note - Infectious Disease   Angle Velazquez 22 y o  female MRN: [id number]  Unit/Bed#: [facility] 723-01 Encounter: [id number]      Impression/Plan:  1  Infectious Mononucleosis - patient with negative Monospot but positive EBV serologies in the setting of fever and lymphadenopathy without pharyngitis  Leukocytosis noted this morning, increased from 8 40 to 12 01   - supportive care   - continue antiinflammatory medication   - follow-up blood cultures and HIV RNA     2  Chest pain - likely in the setting of myopericarditis  Echocardiogram unremarkable  Currently on regimen of colchicine  Per cardiology recommendations, ibuprofen 800 mg Q8H switched to Naproxen 500 mg BID with meals and Toradol 15 mg Q6H PRN  - continue anti-inflammatory treatment              - cardiology follow-up    3  Chlamydia -  Positive Chlamydia trachomatis DNA probe, negative gonorrhoeae DNA probe  - recommend treatment with azithromycin 1 gram PO one time dose    Antibiotics:  - recommend treatment with azithromycin 1 gram PO one time dose for chlamydia  Subjective:  Patient continues to report general malaise, intermittent hot flashes, fever, and chills overnight  She also reports mild back pain that started yesterday in addition to continuing myalgias, chest pain, headaches, and swollen and painful cervical lymph nodes  She has no nausea, vomiting, diarrhea; no cough, shortness of breath  Patient was given Toradol 15 mg IV overnight  Patient notes that the Toradol she was given for pain control overnight had a quicker onset than the ibuprofen she had been taking, but the degree of pain relief was similar between the two medications      Objective:  Vitals:  Temp:  [97 3 °F (36 3 °C)-99 4 °F (37 4 °C)] 99 4 °F (37 4 °C)  HR:  [] 94  Resp:  [16] 16  BP: ()/(52-85) 102/69  SpO2:  [95 %-100 %] 99 %  Temp (24hrs), Av 2 °F (36 8 °C), Min:97 3 °F (36 3 °C), Max:99 4 °F (37 4 °C)  Current: Temperature: 99 4 °F (37 4 °C)    Physical Exam:   General Appearance:  Alert, interactive, nontoxic, no acute distress  Throat:    Neck: Oropharynx moist without lesions  No exudative pharyngitis  Supple, with FROM  Symmetric anterior cervical, submandibular, and submental lymphadenopathy continues to persist    Lungs:   Clear to auscultation bilaterally; no wheezes, rhonchi or rales; respirations unlabored   Heart:  RRR; no murmur, rub or gallop  Rate approximately 80  Abdomen:   Soft, non-tender, non-distended, positive bowel sounds  Extremities: No clubbing, cyanosis or edema   Skin: No new rashes or lesions  No draining wounds noted  Labs, Imaging, & Other studies:   All pertinent labs and imaging studies were personally reviewed  Results from last 7 days   Lab Units 01/07/20  0751 01/05/20  0515 01/04/20  0617   WBC Thousand/uL 12 01* 8 40 10 11   HEMOGLOBIN g/dL 11 8 12 1 10 5*   PLATELETS Thousands/uL 557* 496* 381     Results from last 7 days   Lab Units 01/07/20  0751 01/05/20  0515  01/04/20  0617   SODIUM mmol/L 139 140  --  137   POTASSIUM mmol/L 3 7 3 9  --  3 1*   CHLORIDE mmol/L 109* 110*  --  107   CO2 mmol/L 24 25  --  27   BUN mg/dL 3* 6  --  4*   CREATININE mg/dL 0 58* 0 51*  --  0 67   EGFR ml/min/1 73sq m 132 138  --  126   CALCIUM mg/dL 9 1 9 1  --  8 8   AST U/L 12 38  --   --    ALT U/L 46 74   < >  --    ALK PHOS U/L 98 115   < >  --     < > = values in this interval not displayed  Results from last 7 days   Lab Units 01/05/20  1752   BLOOD CULTURE  No Growth at 24 hrs  Results Reviewed     Procedure Component Value Units Date/Time    Chlamydia/GC amplified DNA by PCR [[id number]]  (Abnormal) Collected:  01/04/20 0308    Lab Status:  Final result Specimen:  Urine, Other Updated:  01/06/20 2334     N gonorrhoeae, DNA Probe Negative     Chlamydia trachomatis, DNA Probe Positive-    Narrative:       Test performed using PCR amplification of target DNA    This test is intended as an aid in the diagnosis of Chlamydial and gonococcal disease  This test has not been evaluated in patients younger than 15years of age and is not recommended for evaluation of suspected sexual abuse  Additional testing is recommended when the results do not correlate with clinical signs and symptoms  EBV acute panel [[id number]]  (Abnormal) Collected:  01/04/20 0619    Lab Status:  Final result Specimen:  Blood from Arm, Left Updated:  01/06/20 1606     EBV Early Antigen Ab, IgG 69 3- U/mL      EBV VCA IgG 140 0- U/mL      EBV VCA IgM 73 6- U/mL      EBV Nuclear Ag Ab >600 0- U/mL      EBV Interp  Comment    Narrative:       Performed at:  [address] [place] [address]  [id number]  : Breana Hernandez MD, Phone:  [phone]    HIV 1/2 AG-AB combo [[id number]]  (Normal) Collected:  01/04/20 0617    Lab Status:  Final result Specimen:  Blood from Arm, Left Updated:  01/06/20 1047     HIV-1/HIV-2 Ab Non-Reactive    Narrative: This test detects HIV 1, HIV2 and p24 Antigen       Mononucleosis screen [[id number]]  (Normal) Collected:  01/04/20 1346    Lab Status:  Final result Specimen:  Blood from Arm, Left Updated:  01/06/20 0746     Monotest Negative         Madonna Long, MS4  Infectious Disease

## 2020-01-08 LAB
BASOPHILS # BLD AUTO: 0.07 THOUSANDS/ΜL (ref 0–0.1)
BASOPHILS NFR BLD AUTO: 1 % (ref 0–1)
C3 SERPL-MCNC: 158 MG/DL (ref 90–180)
C4 SERPL-MCNC: 19 MG/DL (ref 10–40)
CRP SERPL QL: 75.4 MG/L
DAT POLY-SP REAG RBC QL: NEGATIVE
EOSINOPHIL # BLD AUTO: 0.12 THOUSAND/ΜL (ref 0–0.61)
EOSINOPHIL NFR BLD AUTO: 1 % (ref 0–6)
ERYTHROCYTE [DISTWIDTH] IN BLOOD BY AUTOMATED COUNT: 14.9 % (ref 11.6–15.1)
ERYTHROCYTE [SEDIMENTATION RATE] IN BLOOD: 87 MM/HOUR (ref 0–20)
HCT VFR BLD AUTO: 38.3 % (ref 34.8–46.1)
HGB BLD-MCNC: 12 G/DL (ref 11.5–15.4)
IGA SERPL-MCNC: 407 MG/DL (ref 70–400)
IGG SERPL-MCNC: 1790 MG/DL (ref 700–1600)
IGM SERPL-MCNC: 232 MG/DL (ref 40–230)
IMM GRANULOCYTES # BLD AUTO: 0.08 THOUSAND/UL (ref 0–0.2)
IMM GRANULOCYTES NFR BLD AUTO: 1 % (ref 0–2)
LYMPHOCYTES # BLD AUTO: 2.86 THOUSANDS/ΜL (ref 0.6–4.47)
LYMPHOCYTES NFR BLD AUTO: 25 % (ref 14–44)
MCH RBC QN AUTO: 26.7 PG (ref 26.8–34.3)
MCHC RBC AUTO-ENTMCNC: 31.3 G/DL (ref 31.4–37.4)
MCV RBC AUTO: 85 FL (ref 82–98)
MONOCYTES # BLD AUTO: 0.83 THOUSAND/ΜL (ref 0.17–1.22)
MONOCYTES NFR BLD AUTO: 7 % (ref 4–12)
NEUTROPHILS # BLD AUTO: 7.65 THOUSANDS/ΜL (ref 1.85–7.62)
NEUTS SEG NFR BLD AUTO: 65 % (ref 43–75)
NRBC BLD AUTO-RTO: 0 /100 WBCS
PLATELET # BLD AUTO: 566 THOUSANDS/UL (ref 149–390)
PMV BLD AUTO: 8.6 FL (ref 8.9–12.7)
RBC # BLD AUTO: 4.5 MILLION/UL (ref 3.81–5.12)
RHEUMATOID FACT SER QL LA: NEGATIVE
WBC # BLD AUTO: 11.61 THOUSAND/UL (ref 4.31–10.16)

## 2020-01-08 PROCEDURE — 86140 C-REACTIVE PROTEIN: CPT | Performed by: INTERNAL MEDICINE

## 2020-01-08 PROCEDURE — 85652 RBC SED RATE AUTOMATED: CPT | Performed by: INTERNAL MEDICINE

## 2020-01-08 PROCEDURE — 86225 DNA ANTIBODY NATIVE: CPT | Performed by: INTERNAL MEDICINE

## 2020-01-08 PROCEDURE — 86880 COOMBS TEST DIRECT: CPT | Performed by: INTERNAL MEDICINE

## 2020-01-08 PROCEDURE — 86430 RHEUMATOID FACTOR TEST QUAL: CPT | Performed by: INTERNAL MEDICINE

## 2020-01-08 PROCEDURE — 99232 SBSQ HOSP IP/OBS MODERATE 35: CPT | Performed by: INTERNAL MEDICINE

## 2020-01-08 PROCEDURE — 86038 ANTINUCLEAR ANTIBODIES: CPT | Performed by: INTERNAL MEDICINE

## 2020-01-08 PROCEDURE — 82784 ASSAY IGA/IGD/IGG/IGM EACH: CPT | Performed by: INTERNAL MEDICINE

## 2020-01-08 PROCEDURE — 86160 COMPLEMENT ANTIGEN: CPT | Performed by: INTERNAL MEDICINE

## 2020-01-08 PROCEDURE — 85025 COMPLETE CBC W/AUTO DIFF WBC: CPT | Performed by: INTERNAL MEDICINE

## 2020-01-08 PROCEDURE — 99253 IP/OBS CNSLTJ NEW/EST LOW 45: CPT | Performed by: NURSE PRACTITIONER

## 2020-01-08 RX ORDER — KETOROLAC TROMETHAMINE 30 MG/ML
15 INJECTION, SOLUTION INTRAMUSCULAR; INTRAVENOUS EVERY 6 HOURS PRN
Status: COMPLETED | OUTPATIENT
Start: 2020-01-08 | End: 2020-01-08

## 2020-01-08 RX ORDER — METHOCARBAMOL 750 MG/1
750 TABLET, FILM COATED ORAL EVERY 6 HOURS SCHEDULED
Status: DISCONTINUED | OUTPATIENT
Start: 2020-01-08 | End: 2020-01-11 | Stop reason: HOSPADM

## 2020-01-08 RX ORDER — ACETAMINOPHEN 325 MG/1
975 TABLET ORAL EVERY 8 HOURS SCHEDULED
Status: DISCONTINUED | OUTPATIENT
Start: 2020-01-08 | End: 2020-01-11 | Stop reason: HOSPADM

## 2020-01-08 RX ADMIN — ACETAMINOPHEN 975 MG: 325 TABLET ORAL at 13:59

## 2020-01-08 RX ADMIN — ACETAMINOPHEN 975 MG: 325 TABLET ORAL at 22:22

## 2020-01-08 RX ADMIN — METHOCARBAMOL 750 MG: 750 TABLET, FILM COATED ORAL at 19:40

## 2020-01-08 RX ADMIN — METHOCARBAMOL 750 MG: 750 TABLET, FILM COATED ORAL at 13:59

## 2020-01-08 RX ADMIN — SODIUM CHLORIDE 100 ML/HR: 0.9 INJECTION, SOLUTION INTRAVENOUS at 11:55

## 2020-01-08 RX ADMIN — NAPROXEN 500 MG: 500 TABLET ORAL at 19:39

## 2020-01-08 RX ADMIN — FAMOTIDINE 20 MG: 10 TABLET, FILM COATED ORAL at 11:53

## 2020-01-08 RX ADMIN — ACETAMINOPHEN 650 MG: 325 TABLET ORAL at 05:29

## 2020-01-08 RX ADMIN — KETOROLAC TROMETHAMINE 15 MG: 30 INJECTION, SOLUTION INTRAMUSCULAR at 00:59

## 2020-01-08 RX ADMIN — NAPROXEN 500 MG: 500 TABLET ORAL at 09:08

## 2020-01-08 RX ADMIN — ONDANSETRON 4 MG: 2 INJECTION INTRAMUSCULAR; INTRAVENOUS at 12:45

## 2020-01-08 RX ADMIN — COLCHICINE 0.6 MG: 0.6 TABLET, FILM COATED ORAL at 09:08

## 2020-01-08 RX ADMIN — LIDOCAINE 1 PATCH: 50 PATCH TOPICAL at 09:07

## 2020-01-08 RX ADMIN — LIDOCAINE 1 PATCH: 50 PATCH TOPICAL at 09:06

## 2020-01-08 RX ADMIN — FAMOTIDINE 20 MG: 10 TABLET, FILM COATED ORAL at 19:39

## 2020-01-08 RX ADMIN — KETOROLAC TROMETHAMINE 15 MG: 30 INJECTION, SOLUTION INTRAMUSCULAR at 09:08

## 2020-01-08 NOTE — PLAN OF CARE
Problem: PAIN - ADULT  Goal: Verbalizes/displays adequate comfort level or baseline comfort level  Description  Interventions:  - Encourage patient to monitor pain and request assistance  - Assess pain using appropriate pain scale  - Administer analgesics based on type and severity of pain and evaluate response  - Implement non-pharmacological measures as appropriate and evaluate response  - Consider cultural and social influences on pain and pain management  - Notify physician/advanced practitioner if interventions unsuccessful or patient reports new pain  Outcome: Progressing     Problem: SAFETY ADULT  Goal: Patient will remain free of falls  Description  INTERVENTIONS:  - Assess patient frequently for physical needs  -  Identify cognitive and physical deficits and behaviors that affect risk of falls    -  [place] fall precautions as indicated by assessment   - Educate patient/family on patient safety including physical limitations  - Instruct patient to call for assistance with activity based on assessment  - Modify environment to reduce risk of injury  - Consider OT/PT consult to assist with strengthening/mobility  Outcome: Progressing  Goal: Maintain or return to baseline ADL function  Description  INTERVENTIONS:  -  Assess patient's ability to carry out ADLs; assess patient's baseline for ADL function and identify physical deficits which impact ability to perform ADLs (bathing, care of mouth/teeth, toileting, grooming, dressing, etc )  - Assess/evaluate cause of self-care deficits   - Assess range of motion  - Assess patient's mobility; develop plan if impaired  - Assess patient's need for assistive devices and provide as appropriate  - Encourage maximum independence but intervene and supervise when necessary  - Involve family in performance of ADLs  - Assess for home care needs following discharge   - Consider OT consult to assist with ADL evaluation and planning for discharge  - Provide patient education as appropriate  Outcome: Progressing  Goal: Maintain or return mobility status to optimal level  Description  INTERVENTIONS:  - Assess patient's baseline mobility status (ambulation, transfers, stairs, etc )    - Identify cognitive and physical deficits and behaviors that affect mobility  - Identify mobility aids required to assist with transfers and/or ambulation (gait belt, sit-to-stand, lift, walker, cane, etc )  - [place] fall precautions as indicated by assessment  - Record patient progress and toleration of activity level on Mobility SBAR; progress patient to next Phase/Stage  - Instruct patient to call for assistance with activity based on assessment  - Consider rehabilitation consult to assist with strengthening/weightbearing, etc   Outcome: Progressing     Problem: DISCHARGE PLANNING  Goal: Discharge to home or other facility with appropriate resources  Description  INTERVENTIONS:  - Identify barriers to discharge w/patient and caregiver  - Arrange for needed discharge resources and transportation as appropriate  - Identify discharge learning needs (meds, wound care, etc )  - Arrange for interpretive services to assist at discharge as needed  - Refer to Case Management Department for coordinating discharge planning if the patient needs post-hospital services based on physician/advanced practitioner order or complex needs related to functional status, cognitive ability, or social support system  Outcome: Progressing     Problem: Knowledge Deficit  Goal: Patient/family/caregiver demonstrates understanding of disease process, treatment plan, medications, and discharge instructions  Description  Complete learning assessment and assess knowledge base    Interventions:  - Provide teaching at level of understanding  - Provide teaching via preferred learning methods  Outcome: Progressing     Problem: CARDIOVASCULAR - ADULT  Goal: Maintains optimal cardiac output and hemodynamic stability  Description  INTERVENTIONS:  - Monitor I/O, vital signs and rhythm  - Monitor for S/S and trends of decreased cardiac output  - Administer and titrate ordered vasoactive medications to optimize hemodynamic stability  - Assess quality of pulses, skin color and temperature  - Assess for signs of decreased coronary artery perfusion  - Instruct patient to report change in severity of symptoms  Outcome: Progressing  Goal: Absence of cardiac dysrhythmias or at baseline rhythm  Description  INTERVENTIONS:  - Continuous cardiac monitoring, vital signs, obtain 12 lead EKG if ordered  - Administer antiarrhythmic and heart rate control medications as ordered  - Monitor electrolytes and administer replacement therapy as ordered  Outcome: Progressing

## 2020-01-08 NOTE — CONSULTS
Consultation - Roselia Colon 22 y o  female MRN: [id number]    Unit/Bed#: [facility] 723-01 Encounter: [id number]      Assessment/Plan     Assessment:   in summary, this is a 70-year-old female history of fever, chills, chest discomfort, lymphadenopathy, all of all vein abruptly and recently  She has minor leukocytosis with normal differential   I believe this is reactive  Minor thrombocytosis and anemia, both likely secondary to inflammation, current medical condition  Differential diagnosis includes infection, autoimmune processes, lymphoma or other malignancy  I believe malignant diagnosis is less likely  Supporting this are the abrupt onset of her symptoms, generalized malaise, tenderness of lymphadenopathy, normalcy of differential     My inclination would be to pursue observation for week or so  If symptoms  Have not trended favorably at that time further investigation could follow accordingly  I reviewed the above with the patient  She voiced understanding and agreement  Plan:  See above  History of Present Illness     HPI: Roselia Colon is a 22y o  year old female who presents with Fever chills, nausea vomiting, shortness of breath, lymphadenopathy  Patient was seen a week prior to admission and felt to have a viral syndrome  Symptoms were progressive and she is now admitted to the hospital for the same  On admission CMP was normal   WBC 10 1, hemoglobin 10 5, MCV 86, platelet count 015, normal differential   Subsequently she developed mild leukocytosis with normal differential, platelets in the 715-431 range  Of note, she had a CBC in February 2017 showing a platelet count of 130, normal white count, normal hemoglobin, normal differential   CT of the neck with contrast shows cervical lymphadenopathy up to 1 3 cm       Inpatient consult to Hematology  Consult performed by: Juventino Wisdom DO  Consult ordered by: Lida Harada, MD          Review of Systems   Constitutional: Positive for activity change, appetite change, fatigue and fever  Negative for diaphoresis  HENT: Negative for sinus pain  Eyes: Negative for discharge  Respiratory: Negative for cough and shortness of breath  Cardiovascular: Negative for chest pain  Gastrointestinal: Negative for abdominal pain, constipation and diarrhea  Endocrine: Negative for cold intolerance  Genitourinary: Negative for difficulty urinating and hematuria  Musculoskeletal: Negative for joint swelling  Skin: Negative for rash  Allergic/Immunologic: Negative for environmental allergies  Neurological: Negative for dizziness and headaches  Hematological: Negative for adenopathy  Psychiatric/Behavioral: Negative for agitation  Historical Information   Past Medical History:   Diagnosis Date    Asthma     Thrombocytosis ([facility] [address] )      Past Surgical History:   Procedure Laterality Date    APPENDECTOMY       Social History   Social History     Substance and Sexual Activity   Alcohol Use Yes    Frequency: Monthly or less    Drinks per session: 1 or 2     Social History     Substance and Sexual Activity   Drug Use Never     Social History     Tobacco Use   Smoking Status Never Smoker   Smokeless Tobacco Never Used     Family History:   Family History   Problem Relation Age of Onset    No Known Problems Mother        Meds/Allergies   all current active meds have been reviewed  Allergies   Allergen Reactions    Latex Rash       Objective   Vitals: Blood pressure 100/67, pulse 78, temperature 97 8 °F (36 6 °C), resp  rate 18, height 5' 1" (1 549 m), weight 55 5 kg (122 lb 4 8 oz), last menstrual period 12/23/2019, SpO2 99 %, unknown if currently breastfeeding      Intake/Output Summary (Last 24 hours) at 1/8/2020 1149  Last data filed at 1/8/2020 0529  Gross per 24 hour   Intake 2340 ml   Output    Net 2340 ml     Invasive Devices     Peripheral Intravenous Line            Peripheral IV 01/06/20 Left;Upper;Ventral (anterior) Arm 2 days                Physical Exam   Constitutional: She is oriented to person, place, and time  She appears well-developed  HENT:   Head: Normocephalic  Eyes: Pupils are equal, round, and reactive to light  Cardiovascular: Normal rate and regular rhythm  Pulmonary/Chest: Breath sounds normal    Abdominal: Soft  There is no tenderness  Musculoskeletal: She exhibits no edema  Lymphadenopathy:     She has cervical adenopathy (Bilateral tender nodes up to 1 5 cm )  Neurological: She is alert and oriented to person, place, and time  Skin: Skin is warm  Psychiatric: She has a normal mood and affect  Lab Results: I have personally reviewed pertinent reports  Imaging Studies: I have personally reviewed pertinent reports  EKG, Pathology, and Other Studies: I have personally reviewed pertinent reports  Code Status: Level 1 - Full Code  Advance Directive and Living Will:      Power of :    POLST:      Counseling / Coordination of Care  Total floor / unit time spent today 40 minutes  Greater than 50% of total time was spent with the patient and / or family counseling and / or coordination of care   A description of the counseling / coordination of care:  See note

## 2020-01-08 NOTE — PROGRESS NOTES
Progress Note - Claudette Nordmann Nolasco 2000, 22 y o  female MRN: [id number]    Unit/Bed#: [facility] 723-01 Encounter: [id number]    Primary Care Provider: YANIRA Tapia   Date and time admitted to hospital: 1/3/2020  9:54 PM        * Chest pain  Assessment & Plan  · Discussed with Cardiology - no significant component of myopericarditis - likely costochondritis  · Continue naproxen  · Maintained on colchicine per Cardiology  · Lidoderm patch added    Myopericarditis  Assessment & Plan  · Echo normal, troponin minimally elevated, EKG normal  · As above  · Discussed with Cardiology - input appreciated    Cervical lymphadenopathy  Assessment & Plan  · Significant LAD on admission with fever  · CT neck  - Cervical lymphadenopathy   · Elevated ESR and CRP  · EBV VCA IgM positive but EBNA positive - unusual for acute infection per ID  · CMV IgM, HIV screen negative  · Follow-up HIV RNA  · ID following - input appreciated  · Probable viral - rule out lymphoproliferative disorder/autoimmune disorder  · Onc/rheum eval  · Discussed with Dr Margarita Garcia - autoimmune panel ordered  · Repeat ESR, CRP         SIRS (systemic inflammatory response syndrome) (HCC)  Assessment & Plan  · Tachycardia and febrile on admission   · Secondary to above  · Continue with above treatment    Chlamydia  Assessment & Plan  · Asymptomatic  · S/p Azithromycin 1 gm x 1    Neck pain  Assessment & Plan  · Pt reports to posterior neck pain  Suspected in setting of lymphadenopathy with significantly enlarged lymph nodes   · No osseous abnormality seen on CT  · Continue supportive care    Patient Centered Rounds: I have performed bedside rounds with nursing staff today  Discussions with Specialists or Other Care Team Provider:  Discussed with Cardiology    Education and Discussions with Family / Patient:  Kathrene Sicard family update    Time Spent for Care: 20 minutes    More than 50% of total time spent on counseling and coordination of care as described above     Current Length of Stay: 3 day(s)    Current Patient Status: Inpatient   Certification Statement: The patient will continue to require additional inpatient hospital stay due to Fever, lymphadenopathy undergoing evaluation    Code Status: Level 1 - Full Code    Subjective:   Complains of left-sided chest pain with tenderness persisting  Mild nausea  No vomiting  No fever    Objective:     Vitals:   Temp (24hrs), Av 4 °F (36 9 °C), Min:97 3 °F (36 3 °C), Max:99 4 °F (37 4 °C)    Temp:  [97 3 °F (36 3 °C)-99 4 °F (37 4 °C)] 98 9 °F (37 2 °C)  HR:  [] 86  Resp:  [16] 16  BP: (101-121)/(67-77) 120/71  SpO2:  [95 %-100 %] 100 %  Body mass index is 23 11 kg/m²  Physical Exam:     Physical Exam   Constitutional: She is oriented to person, place, and time  HENT:   Head: Normocephalic and atraumatic  Eyes: Pupils are equal, round, and reactive to light  EOM are normal    Neck: Normal range of motion  Neck supple  Cardiovascular: Normal rate and regular rhythm  Pulmonary/Chest: Effort normal and breath sounds normal  She exhibits tenderness  Abdominal: Soft  Bowel sounds are normal  She exhibits no distension  There is no tenderness  There is no guarding  Musculoskeletal: She exhibits no edema  Neurological: She is alert and oriented to person, place, and time  Skin: Skin is warm and dry  Psychiatric: She has a normal mood and affect         Additional Data:     Labs:    Results from last 7 days   Lab Units 20  0751   WBC Thousand/uL 12 01*   HEMOGLOBIN g/dL 11 8   HEMATOCRIT % 37 0   PLATELETS Thousands/uL 557*   NEUTROS PCT % 67   LYMPHS PCT % 24   MONOS PCT % 6   EOS PCT % 1     Results from last 7 days   Lab Units 20  0751   SODIUM mmol/L 139   POTASSIUM mmol/L 3 7   CHLORIDE mmol/L 109*   CO2 mmol/L 24   BUN mg/dL 3*   CREATININE mg/dL 0 58*   ANION GAP mmol/L 6   CALCIUM mg/dL 9 1   ALBUMIN g/dL 3 0*   TOTAL BILIRUBIN mg/dL 0 21   ALK PHOS U/L 98   ALT U/L 46   AST U/L 12   GLUCOSE RANDOM mg/dL 108       * I Have Reviewed All Lab Data Listed Above  * Additional Pertinent Lab Tests Reviewed: [address] Admission Reviewed      Recent Cultures (last 7 days):     Results from last 7 days   Lab Units 01/05/20  1752   BLOOD CULTURE  No Growth at 24 hrs  Last 24 Hours Medication List:     Current Facility-Administered Medications:  acetaminophen 650 mg Oral Q6H PRN Susanne Downing PA-C    colchicine 0 6 mg Oral BID Brandon Barth MD    enoxaparin 40 mg Subcutaneous Q24H [address] Rhetta Miriam, DO    famotidine 20 mg Oral BID Arleen Pulido MD    lidocaine 1 patch Topical Daily Arleen Pulido MD    lidocaine 1 patch Topical Daily Arleen Pulido MD    naproxen 500 mg Oral BID With Meals Shefali Stevens PA-C    ondansetron 4 mg Intravenous Q6H PRN MATTHEW White-TETO    sodium chloride 100 mL/hr Intravenous Continuous Rhetta Miriam, DO Last Rate: Stopped (01/07/20 2019)        Today, Patient Was Seen By: Arleen Pulido MD    ** Please Note: Dictation voice to text software may have been used in the creation of this document   **

## 2020-01-08 NOTE — CONSULTS
Consultation - Acute Pain Service   Dhaval Gibson 22 y o  female MRN: [id number]  Unit/Bed#: [facility] 723-01 Encounter: [id number]               Assessment/Plan     Assessment:   Patient Active Problem List   Diagnosis    Myopericarditis    Elevated troponin    Cervical lymphadenopathy    Neck pain    SIRS (systemic inflammatory response syndrome) (HCC)    Chest pain    Chlamydia        Plan:   Acute chest wall pain  Lymphadenopathy  Acute neck pain  · Change Tylenol to 975 mg every 8 hours scheduled  · Discontinue p r n  Tylenol  · Continue lidocaine patch to chest wall daily, on for 12 hours and off for 12 hours  · Continue naproxen 500 mg twice a day with meals  · Patient on Pepcid twice a day for GI prophylaxis  · Add Robaxin 750 mg every 6 hours scheduled for musculoskeletal pain  · No opioids recommended    Treatment recommendations discussed with primary care service  [facility] will continue to follow; please contact [facility] ( [phone]) with any further questions    History of Present Illness    Admit Date:  1/3/2020  Hospital Day:  4 days  Primary Service:  Hospitalist  Attending Provider:  Marcelo Greenberg MD  Reason for Consult / Principal Problem:  Acute neck and chest wall pain  HPI: Dhaval Gibson is a 22y o  year old female who presented with fever, chills, shortness of breath and intermittent chest pain  Elevated troponin on admission, cardiology consulted due to presumed mild pericarditis; recommending treating chest wall pain as musculoskeletal or inflammatory  Infectious disease consulted for fever and lymphadenopathy which is possibly secondary to acute viral process; concern for possible lymphoproliferative disorder or autoimmune disorder  Hematology consulted and recommending to continue to monitor symptoms for approximately 1 week and to consider further investigation if symptoms not improving in that time      Current pain location(s):  Chest wall  Pain Scale:   7-10  Quality: Sharp, stabbing  Current Analgesic regimen: In the last 24 hours patient received Toradol, lidocaine patch, naproxen  Patient resting in bed, reporting ongoing pain in her chest wall and neck, tolerating current analgesic regimen with minimal relief on NSAIDs and Tylenol  Unsure if lidocaine patch is effective  Pain History:  No history of chronic pain, does not take opioids at home  Inpatient consult to Acute Pain Service  Consult performed by: YANIRA Baker  Consult ordered by: Kan Maza MD          Review of Systems   Constitutional: Positive for fever  Respiratory: Positive for shortness of breath  Cardiovascular: Positive for chest pain  Gastrointestinal: Positive for nausea  Negative for abdominal pain and vomiting  Genitourinary: Negative for difficulty urinating  Musculoskeletal: Positive for neck pain  Neurological: Positive for headaches  Negative for dizziness, weakness and numbness         Historical Information   Past Medical History:   Diagnosis Date    Asthma     Thrombocytosis ([facility] [address] )      Past Surgical History:   Procedure Laterality Date    APPENDECTOMY       Social History   Social History     Substance and Sexual Activity   Alcohol Use Yes    Frequency: Monthly or less    Drinks per session: 1 or 2     Social History     Substance and Sexual Activity   Drug Use Never     Social History     Tobacco Use   Smoking Status Never Smoker   Smokeless Tobacco Never Used     Family History: non-contributory    Meds/Allergies   all current active meds have been reviewed, current meds:   Current Facility-Administered Medications   Medication Dose Route Frequency    acetaminophen (TYLENOL) tablet 650 mg  650 mg Oral Q6H PRN    colchicine (COLCRYS) tablet 0 6 mg  0 6 mg Oral BID    enoxaparin (LOVENOX) subcutaneous injection 40 mg  40 mg Subcutaneous Q24H CADENCE    famotidine (PEPCID) tablet 20 mg  20 mg Oral BID    lidocaine (LIDODERM) 5 % patch 1 patch  1 patch Topical Daily    lidocaine (LIDODERM) 5 % patch 1 patch  1 patch Topical Daily    naproxen (NAPROSYN) tablet 500 mg  500 mg Oral BID With Meals    ondansetron (ZOFRAN) injection 4 mg  4 mg Intravenous Q6H PRN    sodium chloride 0 9 % infusion  100 mL/hr Intravenous Continuous    and PTA meds:   Prior to Admission Medications   Prescriptions Last Dose Informant Patient Reported? Taking?   ondansetron (ZOFRAN) 4 mg tablet   Yes Yes   Sig: Take 4 mg by mouth every 6 (six) hours as needed for nausea or vomiting      Facility-Administered Medications: None       Allergies   Allergen Reactions    Latex Rash       Objective   Temp:  [97 8 °F (36 6 °C)-99 9 °F (37 7 °C)] 97 8 °F (36 6 °C)  HR:  [] 78  Resp:  [16-20] 18  BP: (100-121)/(57-71) 100/67    Intake/Output Summary (Last 24 hours) at 1/8/2020 1229  Last data filed at 1/8/2020 1154  Gross per 24 hour   Intake 3340 ml   Output    Net 3340 ml       Physical Exam   Constitutional: She is oriented to person, place, and time  She is active and cooperative  No distress  HENT:   Head: Normocephalic and atraumatic  Eyes: EOM are normal    Neck: Normal range of motion  Cardiovascular: Normal rate  Pulmonary/Chest: Effort normal and breath sounds normal  No respiratory distress  She has no wheezes  Neurological: She is alert and oriented to person, place, and time  Skin: Skin is warm and dry  No rash noted  She is not diaphoretic  Psychiatric: She has a normal mood and affect  Her behavior is normal    Vitals reviewed  Lab Results:   I have personally reviewed pertinent labs  , CBC:   Lab Results   Component Value Date    WBC 11 61 (H) 01/08/2020    HGB 12 0 01/08/2020    HCT 38 3 01/08/2020    MCV 85 01/08/2020     (H) 01/08/2020    MCH 26 7 (L) 01/08/2020    MCHC 31 3 (L) 01/08/2020    RDW 14 9 01/08/2020    MPV 8 6 (L) 01/08/2020    NRBC 0 01/08/2020   , CMP: No results found for: SODIUM, K, CL, CO2, ANIONGAP, BUN, CREATININE, GLUCOSE, CALCIUM, AST, ALT, ALKPHOS, PROT, BILITOT, EGFR    Imaging Studies: I have personally reviewed pertinent reports  Counseling / Coordination of Care  Total floor / unit time spent today Level 3 = 55 minutes  Greater than 50% of total time was spent with the patient and / or family counseling and / or coordination of care  A description of the counseling / coordination of care:  Reviewed plan of care and medications with patient, RN staff, and Primary Care Service      Almarie Mcburney, CRNP  Acute Pain Service

## 2020-01-08 NOTE — ASSESSMENT & PLAN NOTE
· Echo normal, troponin minimally elevated, EKG normal  · As above  · Discussed with Cardiology - input appreciated

## 2020-01-08 NOTE — PROGRESS NOTES
Progress Note - Infectious Disease   Ash Velazquez 22 y o  female MRN: [id number]  Unit/Bed#: [facility] 723-01 Encounter: [id number]      Impression/Plan:  1  Fever and lymphadenopathy-possibly secondary to an acute viral process  The EBV VCA IgM is positive, but the EBNA is also positive which would be unusual pattern for acute infection  Further would be a bit unusual for the patient to not have any atypical lymphocytes, LFT abnormalities, or pharyngitis with acute mono syndrome  The CMV IgM is negative  HIV screen is negative  I continue be concerned about the possibility of lymphoproliferative disorder or and autoimmune disorder  The patient's mother apparently has mixed connective tissue disease  Patient's symptoms persist with only modest improvement  Temperature curve is decreased  -monitor off antibiotics  -followup HIV RNA  -check serum RPR  -follow-up blood cultures  -consult Hematology Oncology and rheumatology  -autoimmune workup initiated  -may end up needing a lymph node biopsy     2  Chlamydia-asymptomatic  Discussed with the patient this diagnosis any importance of having a partner treated  Patient treated with azithromycin x1  -check RPR  -will need repeat testing in 3 months    3  Chest pain-now felt to be musculoskeletal   Less likely myopericarditis as per Cardiology   -symptomatic treatment      Antibiotics:  None    Subjective:  Patient has no fever, chills, sweats although still subjectively has a fever; no nausea, vomiting, diarrhea; no cough, shortness of breath; still having neck, back, chest pain  No new symptoms      Objective:  Vitals:  Temp:  [98 1 °F (36 7 °C)-99 9 °F (37 7 °C)] 99 9 °F (37 7 °C)  HR:  [] 102  Resp:  [16-20] 20  BP: (101-121)/(57-71) 106/57  SpO2:  [99 %-100 %] 99 %  Temp (24hrs), Av 8 °F (37 1 °C), Min:98 1 °F (36 7 °C), Max:99 9 °F (37 7 °C)  Current: Temperature: 99 9 °F (37 7 °C)    Physical Exam:   General Appearance:  Alert, interactive, nontoxic, no acute distress  Throat: Oropharynx moist without lesions  Neck: Persistent lymphadenopathy the anterior and posterior cervical, submental, submandibular region   Lungs:   Clear to auscultation bilaterally; no wheezes, rhonchi or rales; respirations unlabored   Heart:  Tachycardia; no murmur, rub or gallop   Abdomen:   Soft, non-tender, non-distended, positive bowel sounds  Extremities: No clubbing, cyanosis or edema   Skin: No new rashes or lesions  No draining wounds noted  Labs, Imaging, & Other studies:   All pertinent labs and imaging studies were personally reviewed  Results from last 7 days   Lab Units 01/08/20  0828 01/07/20  0751 01/05/20  0515   WBC Thousand/uL 11 61* 12 01* 8 40   HEMOGLOBIN g/dL 12 0 11 8 12 1   PLATELETS Thousands/uL 566* 557* 496*     Results from last 7 days   Lab Units 01/07/20  0751 01/05/20  0515  01/04/20  0617   SODIUM mmol/L 139 140  --  137   POTASSIUM mmol/L 3 7 3 9  --  3 1*   CHLORIDE mmol/L 109* 110*  --  107   CO2 mmol/L 24 25  --  27   BUN mg/dL 3* 6  --  4*   CREATININE mg/dL 0 58* 0 51*  --  0 67   EGFR ml/min/1 73sq m 132 138  --  126   CALCIUM mg/dL 9 1 9 1  --  8 8   AST U/L 12 38  --   --    ALT U/L 46 74   < >  --    ALK PHOS U/L 98 115   < >  --     < > = values in this interval not displayed  Results from last 7 days   Lab Units 01/05/20  1752   BLOOD CULTURE  No Growth at 48 hrs

## 2020-01-08 NOTE — CONSULTS
Rheumatology Consultation - Sriram Baca 22 y o  female MRN: [id number]    Unit/Bed#: [facility] 723-01 Encounter: [id number]      Assessment/Plan     Assessment: This is a 22years old female with lymphadenopathy in the submandibular and submental regions associated with fever and chills as well as significant elevation of inflammatory markers CRP more than 90 improving and ESR in the 70s with concern for rheumatologic disease given family history of mixed connective tissue disease in her mother as well as hypothyroidism  Plan:    # Fever and chills with lymphadenopathy tender in the submandibular and submental region with positive inflammatory markers significant CRP elevation more than 90 and ESR in the 70s with normal C3 and C4 with positive acute panel of Kristen-Barr virus without evidence of abscess on CT neck soft tissue in the setting of positive family history of mixed connective tissue disease in her mother and hypothyroidism without evidence of arthralgia in history or physical exam or skin rash  Suspects her symptoms due to viral prodrome versus autoimmune disease such as SLE or connective tissue disease  Agree with ERIK, anti double-stranded DNA and rheumatoid factor will follow-up on the results  Also recommend to check for immunoglobulins as well as direct Jeff test   If the ERIK came back positive will recommend prednisone taper starting at 40 mg  # The above plan was  discussed with attending rheumatologist Jose Pepito     History of Present Illness     HPI: Sriram Baca is a 22y o  year old female who presents with past medical history of asthma, allergic rhinitis, anemia and right ovarian cyst who presented with 1 week of swelling of the anterior neck under the mandible  Her symptoms started suddenly about 1 week ago with swelling under her mandible and she thought is due to infection and has tooth as her dentist in the past told had to have her wisdom tooth removed    She is then started to feeling progressive swelling of her lymph nodes under the mandible associated with chills and fever up to 102 F  She then started feeling sharp chest pain , generalized body aches associated with shortness of breath and was evaluated sent [facility] and thought to have viral infection and recommended to come to the emergency room if her condition worsened  She felt the swelling is getting bigger of what she described lymph nodes under her mandible and her symptoms progressing to worse associated now with nausea and abdominal discomfort  She denied any recent sick contacts or recent traveling  She is sexually active with men only  Her family history is noted for mother with connective tissue disease and hypothyroidism with grandmother maternal side with breast cancer  She denied smoking she drinks socially she denied illicit drug use  She works at [employer] but has not been at work for the past 2 weeks and restaurant as a   She dropped out of college due to financial barrier she was a major psychology  Upon admission to the hospital there was concern for the chest pain as her troponin increased to 0 38 and Cardiology consulted who started treatment for empiric viral pericarditis versus myocarditis her echo was unremarkable as well as EKG  She had significant elevation of the inflammatory marker C-reactive protein above 90 and sed rate 71  Infectious disease consulted who recommended dermatology consultation to rule out any rheumatologic process and to monitor off antibiotic  She was found to be positive for chlamydia in her urine with PCR DNA  Her liver function was unremarkable her complement C3 and C4 were both normal   Her acute Kristen-Barr viral panel was positive however Monospot was negative and HIV screening was negative  Her CBC was remarkable for thrombocytosis in the 500s which was cited in the past in her documentation    Urinalysis showed rbc's 20-30 and white blood cell 4 to 10  Her CT soft tissue neck showed lymphadenopathy of sub mandibular and submental as well as jugular vein chain  No abscess noted on CT scan  Chest x-ray was unremarkable  Inpatient consult to Rheumatology     Date/Time 1/8/2020 11:31 AM     Performed by  Julia Cabezas MD     Authorized by Kenneth Conn MD              Review of Systems   Constitutional: Positive for chills, diaphoresis and fever  HENT: Negative for rhinorrhea and sore throat  Eyes: Negative for pain and visual disturbance  Respiratory: Positive for shortness of breath  Negative for cough  Cardiovascular: Positive for chest pain and palpitations  Gastrointestinal: Positive for nausea  Negative for constipation, diarrhea and vomiting  Genitourinary: Negative for difficulty urinating and dysuria  Musculoskeletal: Positive for myalgias, neck pain and neck stiffness  Negative for arthralgias and joint swelling  Skin: Negative for color change and rash  Allergic/Immunologic: Negative for food allergies  Latex allergy causes rash   Neurological: Negative for weakness, numbness and headaches  Historical Information   Past Medical History:   Diagnosis Date    Asthma     Thrombocytosis ([address] )      Past Surgical History:   Procedure Laterality Date    APPENDECTOMY       Social History   Social History     Substance and Sexual Activity   Alcohol Use Yes    Frequency: Monthly or less    Drinks per session: 1 or 2     Social History     Substance and Sexual Activity   Drug Use Never     Social History     Tobacco Use   Smoking Status Never Smoker   Smokeless Tobacco Never Used     Family History: Mother with mixed connective tissue disease and hypothyroidism    Meds/Allergies   all current active meds have been reviewed  Allergies   Allergen Reactions    Latex Rash       Objective   Vitals: Blood pressure 100/67, pulse 78, temperature 97 8 °F (36 6 °C), resp   rate 18, height 5' 1" (1 549 m), weight 55 5 kg (122 lb 4 8 oz), last menstrual period 12/23/2019, SpO2 99 %, unknown if currently breastfeeding  Intake/Output Summary (Last 24 hours) at 1/8/2020 1130  Last data filed at 1/8/2020 6980  Gross per 24 hour   Intake 2340 ml   Output    Net 2340 ml     Invasive Devices     Peripheral Intravenous Line            Peripheral IV 01/06/20 Left;Upper;Ventral (anterior) Arm 2 days                Physical Exam   Constitutional: She is oriented to person, place, and time  She appears well-developed and well-nourished  No distress  HENT:   Head: Normocephalic and atraumatic  Mouth/Throat: No oropharyngeal exudate  Eyes: Pupils are equal, round, and reactive to light  Right eye exhibits no discharge  Left eye exhibits no discharge  No scleral icterus  Neck: Neck supple  No neck rigidity  Normal range of motion present  Tender submandibular and submental lymphadenopathy   Cardiovascular: Normal rate, regular rhythm and normal heart sounds  Exam reveals no friction rub  No murmur heard  Pulmonary/Chest: Effort normal and breath sounds normal  No stridor  No respiratory distress  She has no wheezes  She has no rales  Abdominal: Soft  Bowel sounds are normal  She exhibits no distension and no mass  There is no tenderness  There is no rebound and no guarding  Musculoskeletal: She exhibits no edema, tenderness or deformity  Neurological: She is alert and oriented to person, place, and time  Skin: No rash noted  She is not diaphoretic  Multiple skin tattoos   Psychiatric: She has a normal mood and affect  Her behavior is normal  Thought content normal    Vitals reviewed  Lab Results: I have personally reviewed pertinent reports       Lab Results   Component Value Date    WBC 11 61 (H) 01/08/2020    HGB 12 0 01/08/2020    HCT 38 3 01/08/2020    MCV 85 01/08/2020     (H) 01/08/2020     Lab Results   Component Value Date    SODIUM 139 01/07/2020    K 3 7 01/07/2020  (H) 01/07/2020    CO2 24 01/07/2020    BUN 3 (L) 01/07/2020    CREATININE 0 58 (L) 01/07/2020    GLUC 108 01/07/2020    CALCIUM 9 1 01/07/2020     Lab Results   Component Value Date    ALT 46 01/07/2020    AST 12 01/07/2020    ALKPHOS 98 01/07/2020     Lab Results   Component Value Date    CRP 75 4 (H) 01/08/2020   ESR: 71      Imaging Studies: I have personally reviewed pertinent reports  CT soft tissue neck with contrast   Final Result by Giovanny Bates MD (77/84 [id number])      1  Cervical lymphadenopathy with enlarged bilateral jugular chain, submandibular, and submental lymph nodes, nonspecific  These lymph nodes may be reactive however lymphoproliferative disorder is not excluded  Correlate clinically  2   No evidence of peritonsillar abscess or retropharyngeal edema  Workstation performed: [id number]         XR chest 2 views   Final Result by Irene Sylvester MD (01/04 1627)      No acute cardiopulmonary disease  Workstation performed: [id number]            EKG: normal EKG, normal sinus rhythm  EKG, Pathology, and Other Studies: I have personally reviewed pertinent reports  VTE Prophylaxis: Enoxaparin (Lovenox)    Code Status: Level 1 - Full Code  Advance Directive and Living Will:      Power of :    POLST:      Counseling / Coordination of Care  Total floor / unit time spent today 45 minutes  Greater than 50% of total time was spent with the patient and / or family counseling and / or coordination of care   A description of the counseling / coordination of care:     Amber Dumont MD  Available on [employer]  [facility]  Internal medicine resident

## 2020-01-08 NOTE — ASSESSMENT & PLAN NOTE
· Significant LAD on admission with fever  · CT neck  - Cervical lymphadenopathy   · Elevated ESR and CRP  · EBV VCA IgM positive but EBNA positive - unusual for acute infection per ID  · CMV IgM, HIV screen negative  · Follow-up HIV RNA  · ID following - input appreciated  · Probable viral - rule out lymphoproliferative disorder/autoimmune disorder  · Onc/rheum eval  · Discussed with Dr Coe Starla - autoimmune panel ordered  · Repeat ESR, CRP

## 2020-01-08 NOTE — ASSESSMENT & PLAN NOTE
· Discussed with Cardiology - no significant component of myopericarditis - likely costochondritis  · Continue naproxen  · Maintained on colchicine per Cardiology  · Lidoderm patch added

## 2020-01-09 LAB
DSDNA AB SER-ACNC: 2 IU/ML (ref 0–9)
HIV1 RNA # SERPL NAA+PROBE: <20 COPIES/ML
HIV1 RNA SERPL NAA+PROBE-LOG#: NORMAL LOG10COPY/ML

## 2020-01-09 PROCEDURE — 99232 SBSQ HOSP IP/OBS MODERATE 35: CPT | Performed by: INTERNAL MEDICINE

## 2020-01-09 PROCEDURE — 86592 SYPHILIS TEST NON-TREP QUAL: CPT | Performed by: INTERNAL MEDICINE

## 2020-01-09 PROCEDURE — 99232 SBSQ HOSP IP/OBS MODERATE 35: CPT | Performed by: NURSE PRACTITIONER

## 2020-01-09 RX ORDER — SODIUM CHLORIDE 9 MG/ML
50 INJECTION, SOLUTION INTRAVENOUS CONTINUOUS
Status: DISPENSED | OUTPATIENT
Start: 2020-01-09 | End: 2020-01-10

## 2020-01-09 RX ORDER — KETOROLAC TROMETHAMINE 10 MG/1
10 TABLET, FILM COATED ORAL EVERY 6 HOURS PRN
Status: DISCONTINUED | OUTPATIENT
Start: 2020-01-09 | End: 2020-01-11 | Stop reason: HOSPADM

## 2020-01-09 RX ADMIN — SODIUM CHLORIDE 50 ML/HR: 0.9 INJECTION, SOLUTION INTRAVENOUS at 22:30

## 2020-01-09 RX ADMIN — FAMOTIDINE 20 MG: 10 TABLET, FILM COATED ORAL at 08:47

## 2020-01-09 RX ADMIN — ONDANSETRON 4 MG: 2 INJECTION INTRAMUSCULAR; INTRAVENOUS at 22:10

## 2020-01-09 RX ADMIN — ACETAMINOPHEN 650 MG: 325 TABLET ORAL at 16:49

## 2020-01-09 RX ADMIN — LIDOCAINE 1 PATCH: 50 PATCH TOPICAL at 08:51

## 2020-01-09 RX ADMIN — NAPROXEN 500 MG: 500 TABLET ORAL at 08:47

## 2020-01-09 RX ADMIN — METHOCARBAMOL 750 MG: 750 TABLET, FILM COATED ORAL at 11:15

## 2020-01-09 RX ADMIN — ACETAMINOPHEN 975 MG: 325 TABLET ORAL at 13:59

## 2020-01-09 RX ADMIN — LIDOCAINE 1 PATCH: 50 PATCH TOPICAL at 08:47

## 2020-01-09 RX ADMIN — ACETAMINOPHEN 975 MG: 325 TABLET ORAL at 22:32

## 2020-01-09 RX ADMIN — METHOCARBAMOL 750 MG: 750 TABLET, FILM COATED ORAL at 00:05

## 2020-01-09 RX ADMIN — METHOCARBAMOL 750 MG: 750 TABLET, FILM COATED ORAL at 05:10

## 2020-01-09 RX ADMIN — ACETAMINOPHEN 975 MG: 325 TABLET ORAL at 05:10

## 2020-01-09 RX ADMIN — FAMOTIDINE 20 MG: 10 TABLET, FILM COATED ORAL at 17:23

## 2020-01-09 RX ADMIN — KETOROLAC TROMETHAMINE 10 MG: 10 TABLET, FILM COATED ORAL at 11:16

## 2020-01-09 RX ADMIN — METHOCARBAMOL 750 MG: 750 TABLET, FILM COATED ORAL at 17:23

## 2020-01-09 NOTE — PROGRESS NOTES
Progress Note - Marcy Velazquez 2000, 22 y o  female MRN: [id number]    Unit/Bed#: [facility] 723-01 Encounter: [id number]    Primary Care Provider: YANIRA Cheek   Date and time admitted to hospital: 1/3/2020  9:54 PM        * Cervical lymphadenopathy, fever  Assessment & Plan  · Significant LAD on admission with fever  · CT neck  - Cervical lymphadenopathy   · Elevated ESR and CRP  · Mixed connective tissue disease in her mother  · EBV VCA IgM positive but EBNA positive - unusual for acute infection per ID  · CMV IgM, HIV screen negative  · Probable viral - rule out lymphoproliferative disorder/autoimmune disorder  · Seen by oncology - unlikely malignant - if not improved in 1 week can be investigated further  · Follow-up HIV RNA, autoimmune w/u           Chest pain  Assessment & Plan  · Pain musculoskeletal per cardiology  · Continue naproxen, lidoderm patch  · Robaxin, scheduled Tylenol added - acute pain input appreciated      SIRS (systemic inflammatory response syndrome) (HCC)  Assessment & Plan  · Tachycardia and febrile on admission   · Secondary to above  · Continue with above treatment    Chlamydia  Assessment & Plan  · Asymptomatic  · S/p Azithromycin 1 gm x 1        Patient Centered Rounds: Discussed with RN    Education and Discussions with Family / Patient:  Maggy Hall family update    Time Spent for Care: 20 minutes  More than 50% of total time spent on counseling and coordination of care as described above  Current Length of Stay: 4 day(s)    Current Patient Status: Inpatient   Certification Statement: The patient will continue to require additional inpatient hospital stay due to Lymphadenopathy and fever undergoing evaluation    Code Status: Level 1 - Full Code    Subjective:   Continues with left-sided chest and neck pain  No change in lymphadenopathy    Intermittent fever    Objective:     Vitals:   Temp (24hrs), Av 3 °F (36 8 °C), Min:97 3 °F (36 3 °C), Max:99 9 °F (37 7 °C)    Temp:  [97 3 °F (36 3 °C)-99 9 °F (37 7 °C)] 97 3 °F (36 3 °C)  HR:  [] 72  Resp:  [16-20] 16  BP: ()/(57-70) 102/70  SpO2:  [99 %] 99 %  Body mass index is 23 11 kg/m²  Physical Exam:     Physical Exam   Constitutional: She is oriented to person, place, and time  HENT:   Head: Normocephalic and atraumatic  Eyes: Pupils are equal, round, and reactive to light  EOM are normal    Neck: Normal range of motion  Neck supple  Cardiovascular: Normal rate and regular rhythm  Pulmonary/Chest: Effort normal and breath sounds normal    Abdominal: Soft  Bowel sounds are normal    Musculoskeletal: She exhibits no edema  Lymphadenopathy:     She has cervical adenopathy  Neurological: She is alert and oriented to person, place, and time  Skin: Skin is warm and dry  Psychiatric: She has a normal mood and affect  Additional Data:     Labs:    Results from last 7 days   Lab Units 01/08/20  0828   WBC Thousand/uL 11 61*   HEMOGLOBIN g/dL 12 0   HEMATOCRIT % 38 3   PLATELETS Thousands/uL 566*   NEUTROS PCT % 65   LYMPHS PCT % 25   MONOS PCT % 7   EOS PCT % 1     Results from last 7 days   Lab Units 01/07/20  0751   SODIUM mmol/L 139   POTASSIUM mmol/L 3 7   CHLORIDE mmol/L 109*   CO2 mmol/L 24   BUN mg/dL 3*   CREATININE mg/dL 0 58*   ANION GAP mmol/L 6   CALCIUM mg/dL 9 1   ALBUMIN g/dL 3 0*   TOTAL BILIRUBIN mg/dL 0 21   ALK PHOS U/L 98   ALT U/L 46   AST U/L 12   GLUCOSE RANDOM mg/dL 108       * I Have Reviewed All Lab Data Listed Above  * Additional Pertinent Lab Tests Reviewed: [facility] Admission Reviewed      Recent Cultures (last 7 days):     Results from last 7 days   Lab Units 01/05/20  1752   BLOOD CULTURE  No Growth at 48 hrs         Last 24 Hours Medication List:     Current Facility-Administered Medications:  acetaminophen 650 mg Oral Q6H PRN Susanne Downing PA-C    acetaminophen 975 mg Oral [facility] Christine Hussein MD    enoxaparin 40 mg Subcutaneous Q24H [address] Aleida Palacios DO    famotidine 20 mg Oral BID Wyatt Orona MD    lidocaine 1 patch Topical Daily Wyatt Orona MD    lidocaine 1 patch Topical Daily Wyatt Orona MD    methocarbamol 750 mg Oral Q6H [address] Wyatt Orona MD    naproxen 500 mg Oral BID With Meals Ilan Mcmillan PA-C    ondansetron 4 mg Intravenous Q6H PRN Susanne Downing PA-C    sodium chloride 100 mL/hr Intravenous Continuous Aleida Palacios DO Last Rate: 100 mL/hr (01/08/20 1155)        Today, Patient Was Seen By: Wyatt Orona MD    ** Please Note: Dictation voice to text software may have been used in the creation of this document   **

## 2020-01-09 NOTE — PROGRESS NOTES
Progress Note - Infectious Disease   Yue Velazquez 22 y o  female MRN: [id number]  Unit/Bed#: [facility] 723-01 Encounter: [id number]      Impression/Plan:  1  Fever and lymphadenopathy - possibly secondary to an acute viral process  Rheumatology and Oncology consulted due to the possibility of lymphoproliferative disorder or autoimmune disorder  Per Oncology, malignant diagnosis less likely due to the abrupt onset of symptoms, generalized malaise, tenderness of the lymphadenopathy, and normalcy of CBC differential  Per Rheumatology, agrees with checking additional labs for markers of autoimmune disorders, given family history of mixed connective tissue disease in her mother  Direct Jeff negative  Rheumatoid factor negative  C3, C4 levels normal  WBC down to 11 61 from 12 01  Blood cultures with no growth at 72 hours  HIV RNA negative  - monitor off antibiotics   - follow-up RPR   - follow-up ERIK, anti-ds DNA   - follow-up blood cultures   - consider lymph node biopsy if symptoms persist    2  Chlamydia - asymptomatic  Discussed with the patient the importance of having a partner treated  Patient treated with azithromycin x1   - follow-up RPR   - repeat testing in 3 months    3  Chest pain - per cardiology, etiology now felt to be musculoskeletal and less likely to be myopericarditis   - symptomatic treatment as needed    Antibiotics:  None    Subjective:  Patient continues to report general malaise, intermittent hot flashes, fever, and chills overnight  She also reports mild back pain, myalgias, chest pain, headaches, and swollen and painful cervical lymph nodes  She has no nausea, vomiting, diarrhea; no cough, shortness of breath       Objective:  Vitals:  Temp:  [97 3 °F (36 3 °C)-99 °F (37 2 °C)] 99 °F (37 2 °C)  HR:  [72-78] 72  Resp:  [16-18] 18  BP: ()/(58-73) 99/58  SpO2:  [99 %] 99 %  Temp (24hrs), Av °F (36 7 °C), Min:97 3 °F (36 3 °C), Max:99 °F (37 2 °C)  Current: Temperature: 99 °F (37 2 °C)    Physical Exam:   General Appearance:  Alert, interactive, nontoxic, no acute distress  Throat:  Neck: Oropharynx moist without lesions  Supple, FROM  Persistent lymphadenopathy of the anterior and posterior cervical, submandibular, and submental nodes  Lungs:   Clear to auscultation bilaterally; no wheezes, rhonchi or rales; respirations unlabored   Heart:  RRR; no murmur, rub or gallop   Abdomen:   Soft, non-tender, non-distended, positive bowel sounds  Extremities: No clubbing, cyanosis or edema   Skin: No new rashes or lesions  No draining wounds noted  Labs, Imaging, & Other studies:   All pertinent labs and imaging studies were personally reviewed  Results from last 7 days   Lab Units 01/08/20  0828 01/07/20  0751 01/05/20  0515   WBC Thousand/uL 11 61* 12 01* 8 40   HEMOGLOBIN g/dL 12 0 11 8 12 1   PLATELETS Thousands/uL 566* 557* 496*     Results from last 7 days   Lab Units 01/07/20  0751 01/05/20  0515  01/04/20  0617   SODIUM mmol/L 139 140  --  137   POTASSIUM mmol/L 3 7 3 9  --  3 1*   CHLORIDE mmol/L 109* 110*  --  107   CO2 mmol/L 24 25  --  27   BUN mg/dL 3* 6  --  4*   CREATININE mg/dL 0 58* 0 51*  --  0 67   EGFR ml/min/1 73sq m 132 138  --  126   CALCIUM mg/dL 9 1 9 1  --  8 8   AST U/L 12 38  --   --    ALT U/L 46 74   < >  --    ALK PHOS U/L 98 115   < >  --     < > = values in this interval not displayed  Results from last 7 days   Lab Units 01/05/20  1752   BLOOD CULTURE  No Growth at 72 hrs

## 2020-01-09 NOTE — PROGRESS NOTES
Progress Note - Acute Pain Service    Liz Velazquez 22 y o  female MRN: [id number]  Unit/Bed#: [facility] 723-01 Encounter: [id number]      Assessment:   Principal Problem:    Cervical lymphadenopathy, fever  Active Problems:    Myopericarditis    Elevated troponin    Neck pain    SIRS (systemic inflammatory response syndrome) (HCC)    Chest pain    Chlamydia      Plan:   Acute chest wall pain  Lymphadenopathy   Acute neck pain  · Tylenol 975 mg every 8 hours scheduled  · Discontinue Tylenol as needed  · Lidocaine patches daily, on for 12 hours off for 12 hours  · Apply 1 patch to left chest wall and 1 patch to Neck  · Robaxin 750 mg every 6 hours scheduled  · Discontinue naproxen  · Toradol oral 10 mg tablets every 6 hours as needed for breakthrough pain  · Pepcid twice a day for GI prophylaxis    Treatment recommendations discussed with primary care service  [facility] will continue to follow; please contact [facility] ( [phone]) with any further questions    Pain History  Current pain location(s):  Left chest wall, neck  Pain Scale:  7-9  Quality:  Aching, sharp at times  24 hour history:  Patient resting in bed, appears comfortable  Reporting ongoing pain in her neck and left chest wall which overall a feels about the same compared to yesterday  Tolerating current analgesic regimen  Lidocaine patches provide relief for neck pain; overall feels Robaxin has been effective in pain reduction  States Toradol is more effective compared to naproxen  Overnight she felt left chest pain was more severe with radiation towards the right side of her chest with associated palpitations        Meds/Allergies   all current active meds have been reviewed and current meds:   Current Facility-Administered Medications   Medication Dose Route Frequency    acetaminophen (TYLENOL) tablet 650 mg  650 mg Oral Q6H PRN    acetaminophen (TYLENOL) tablet 975 mg  975 mg Oral Q8H [address]    enoxaparin (LOVENOX) subcutaneous injection 40 mg  40 mg Subcutaneous Q24H [facility]    famotidine (PEPCID) tablet 20 mg  20 mg Oral BID    ketorolac (TORADOL) tablet 10 mg  10 mg Oral Q6H PRN    lidocaine (LIDODERM) 5 % patch 1 patch  1 patch Topical Daily    lidocaine (LIDODERM) 5 % patch 1 patch  1 patch Topical Daily    methocarbamol (ROBAXIN) tablet 750 mg  750 mg Oral Q6H [facility]    ondansetron (ZOFRAN) injection 4 mg  4 mg Intravenous Q6H PRN       Allergies   Allergen Reactions    Latex Rash       Objective     Temp:  [97 3 °F (36 3 °C)-99 °F (37 2 °C)] 99 °F (37 2 °C)  HR:  [72] 72  Resp:  [16-18] 18  BP: ()/(58-73) 99/58    Physical Exam   Constitutional: She is oriented to person, place, and time  No distress  HENT:   Head: Normocephalic and atraumatic  Eyes: EOM are normal    Cardiovascular: Normal rate and regular rhythm  Pulmonary/Chest: Effort normal  No respiratory distress  Neurological: She is alert and oriented to person, place, and time  Skin: Skin is warm and dry  She is not diaphoretic  Psychiatric: She has a normal mood and affect  Her behavior is normal    Vitals reviewed  Lab Results:   Results from last 7 days   Lab Units 01/08/20  0828   WBC Thousand/uL 11 61*   HEMOGLOBIN g/dL 12 0   HEMATOCRIT % 38 3   PLATELETS Thousands/uL 566*      Results from last 7 days   Lab Units 01/07/20  0751   POTASSIUM mmol/L 3 7   CHLORIDE mmol/L 109*   CO2 mmol/L 24   BUN mg/dL 3*   CREATININE mg/dL 0 58*   CALCIUM mg/dL 9 1   ALK PHOS U/L 98   ALT U/L 46   AST U/L 12       Counseling / Coordination of Care  Total floor / unit time spent today 15 minutes  Greater than 50% of total time was spent with the patient and / or family counseling and / or coordination of care   A description of the counseling / coordination of care:  Reviewed plan of care and medications with patient and treatment team    YANIRA Ambriz  Acute Pain Service

## 2020-01-09 NOTE — ASSESSMENT & PLAN NOTE
· Pain musculoskeletal per cardiology  · Continue naproxen, lidoderm patch  · Robaxin, scheduled Tylenol added - acute pain input appreciated

## 2020-01-09 NOTE — PROGRESS NOTES
Pt resting comfortably in bed  Pt given PRN pain meds for left chest pain- same pain pt has been experiencing  Will continue to monitor

## 2020-01-09 NOTE — ASSESSMENT & PLAN NOTE
· Significant LAD on admission with fever  · CT neck  - Cervical lymphadenopathy   · Elevated ESR and CRP  · Mixed connective tissue disease in her mother  · EBV VCA IgM positive but EBNA positive - unusual for acute infection per ID  · CMV IgM, HIV screen negative  · Probable viral - rule out lymphoproliferative disorder/autoimmune disorder  · Seen by oncology - unlikely malignant - if not improved in 1 week can be investigated further  · Follow-up HIV RNA, autoimmune w/u

## 2020-01-10 PROBLEM — B37.3 VAGINAL CANDIDIASIS: Status: ACTIVE | Noted: 2020-01-10

## 2020-01-10 LAB
RPR SER QL: NORMAL
RYE IGE QN: NEGATIVE

## 2020-01-10 PROCEDURE — 99232 SBSQ HOSP IP/OBS MODERATE 35: CPT | Performed by: INTERNAL MEDICINE

## 2020-01-10 PROCEDURE — 99232 SBSQ HOSP IP/OBS MODERATE 35: CPT | Performed by: NURSE PRACTITIONER

## 2020-01-10 RX ORDER — FLUCONAZOLE 200 MG/1
200 TABLET ORAL ONCE
Status: COMPLETED | OUTPATIENT
Start: 2020-01-10 | End: 2020-01-10

## 2020-01-10 RX ADMIN — ACETAMINOPHEN 975 MG: 325 TABLET ORAL at 05:42

## 2020-01-10 RX ADMIN — ACETAMINOPHEN 975 MG: 325 TABLET ORAL at 22:22

## 2020-01-10 RX ADMIN — FAMOTIDINE 20 MG: 10 TABLET, FILM COATED ORAL at 08:43

## 2020-01-10 RX ADMIN — KETOROLAC TROMETHAMINE 10 MG: 10 TABLET, FILM COATED ORAL at 15:46

## 2020-01-10 RX ADMIN — ACETAMINOPHEN 975 MG: 325 TABLET ORAL at 13:20

## 2020-01-10 RX ADMIN — LIDOCAINE 1 PATCH: 50 PATCH TOPICAL at 08:44

## 2020-01-10 RX ADMIN — FLUCONAZOLE 200 MG: 200 TABLET ORAL at 15:45

## 2020-01-10 RX ADMIN — METHOCARBAMOL 750 MG: 750 TABLET, FILM COATED ORAL at 05:42

## 2020-01-10 RX ADMIN — KETOROLAC TROMETHAMINE 10 MG: 10 TABLET, FILM COATED ORAL at 08:43

## 2020-01-10 RX ADMIN — METHOCARBAMOL 750 MG: 750 TABLET, FILM COATED ORAL at 17:25

## 2020-01-10 RX ADMIN — METHOCARBAMOL 750 MG: 750 TABLET, FILM COATED ORAL at 11:56

## 2020-01-10 RX ADMIN — KETOROLAC TROMETHAMINE 10 MG: 10 TABLET, FILM COATED ORAL at 00:23

## 2020-01-10 RX ADMIN — METHOCARBAMOL 750 MG: 750 TABLET, FILM COATED ORAL at 00:19

## 2020-01-10 RX ADMIN — ONDANSETRON 4 MG: 2 INJECTION INTRAMUSCULAR; INTRAVENOUS at 15:49

## 2020-01-10 RX ADMIN — FAMOTIDINE 20 MG: 10 TABLET, FILM COATED ORAL at 17:25

## 2020-01-10 NOTE — CONSULTS
Consultation - OB/GYN   López Velazquez 22 y o  female MRN: [id number]  Unit/Bed#: [facility] 723-01 Encounter: [id number]    22 y o  P1 presents with cervical lymphadenopathy and multiple additional complaints, now with complaints of vaginal discharge  She is a patient of [facility]    Chief complaint:  I have a lot of discharge    HPI:  Dai Laura is a 20yo P1 presenting with multiple additional complaints, now with complaints of vaginal discharge  Pt reports that she saw her OB "about three weeks ago" when she was diagnosed with both a bacterial vaginosis infection and a yeast infection  She was provided with prescriptions to treat her infections, and she lost them both prior to taking any of her medications  Pt reports intermittent vaginal discharge and irritation since that time  Reports it comes and goes  She did have a menstrual cycle between now and then, "maybe two weeks ago"  The discharge is described as thick, malodorous, and causes "extremey discomfort" and itching  Of note, pt also was recently diagnosed with a chlamydia infection and was treated with azithromycin  She is no longer seeing her previous partner  She is aware that she will require a test of cure and should not have intercourse for at minimum 7 days from treatment  No menstrual complaints  No fevers, chills  Reports feeling well otherwise   She is frustrated with being in the hospital       Current Complications:  Patient Active Problem List   Diagnosis    Myopericarditis    Elevated troponin    Cervical lymphadenopathy, fever    Neck pain    SIRS (systemic inflammatory response syndrome) (HCC)    Chest pain    Chlamydia    Vaginal candidiasis       PMH:  Past Medical History:   Diagnosis Date    Asthma     Thrombocytosis (HCC)        PSH:  Past Surgical History:   Procedure Laterality Date    APPENDECTOMY         Social Hx:  Presents alone today  Recently  from her partner    Meds:  No current facility-administered medications on file prior to encounter  Current Outpatient Medications on File Prior to Encounter   Medication Sig Dispense Refill    ondansetron (ZOFRAN) 4 mg tablet Take 4 mg by mouth every 6 (six) hours as needed for nausea or vomiting         Allergies: Allergies   Allergen Reactions    Latex Rash       Physical Exam:  BP 98/64   Pulse 70   Temp 99 °F (37 2 °C)   Resp 18   Ht 5' 1" (1 549 m)   Wt 55 5 kg (122 lb 4 8 oz)   LMP 2019 (Exact Date)   SpO2 96%   BMI 23 11 kg/m²       Gen: AaOx3, NAD, pleasant, cooperative  Pulm: Breathing comfortably on room air  Speaking in complete sentences   Abd: Soft, nontender, nondistended  No guarding or rebound tenderness  : External genitalia: Labia minora appear irritated, erythematous, and tender to touch  No obvious discharge on perineum  On speculum examination, large amount of thick, cottage-cheese appearing discharge visible  No obvious odor  NO blood  Cervix visually closed  Extremities: No edema, nontender, moves all extremities without difficulty      Assessment:   22 y o   with vulvovaginal candidiasis    Plan:   Vulvovaginal candidiasis:  - Fluconazole 200mg PO once  - May consider additional dose of fluconazole 200mg PO once if symptoms persist in 72 hours    Remaining comorbidities per primary service    Thank you for the courtesy of the consultation on this very pleasant patient  If additional questions or concerns arise, we would be happy to assist     Discussed with Dr Benjie Muhammad DO  OB/GYN, PGY4  1/10/2020, 1:51 PM

## 2020-01-10 NOTE — PLAN OF CARE
Problem: PAIN - ADULT  Goal: Verbalizes/displays adequate comfort level or baseline comfort level  Description  Interventions:  - Encourage patient to monitor pain and request assistance  - Assess pain using appropriate pain scale  - Administer analgesics based on type and severity of pain and evaluate response  - Implement non-pharmacological measures as appropriate and evaluate response  - Consider cultural and social influences on pain and pain management  - Notify physician/advanced practitioner if interventions unsuccessful or patient reports new pain  Outcome: Progressing     Problem: SAFETY ADULT  Goal: Patient will remain free of falls  Description  INTERVENTIONS:  - Assess patient frequently for physical needs  -  Identify cognitive and physical deficits and behaviors that affect risk of falls    -  [place] fall precautions as indicated by assessment   - Educate patient/family on patient safety including physical limitations  - Instruct patient to call for assistance with activity based on assessment  - Modify environment to reduce risk of injury  - Consider OT/PT consult to assist with strengthening/mobility  Outcome: Progressing  Goal: Maintain or return to baseline ADL function  Description  INTERVENTIONS:  -  Assess patient's ability to carry out ADLs; assess patient's baseline for ADL function and identify physical deficits which impact ability to perform ADLs (bathing, care of mouth/teeth, toileting, grooming, dressing, etc )  - Assess/evaluate cause of self-care deficits   - Assess range of motion  - Assess patient's mobility; develop plan if impaired  - Assess patient's need for assistive devices and provide as appropriate  - Encourage maximum independence but intervene and supervise when necessary  - Involve family in performance of ADLs  - Assess for home care needs following discharge   - Consider OT consult to assist with ADL evaluation and planning for discharge  - Provide patient education as appropriate  Outcome: Progressing  Goal: Maintain or return mobility status to optimal level  Description  INTERVENTIONS:  - Assess patient's baseline mobility status (ambulation, transfers, stairs, etc )    - Identify cognitive and physical deficits and behaviors that affect mobility  - Identify mobility aids required to assist with transfers and/or ambulation (gait belt, sit-to-stand, lift, walker, cane, etc )  - [place] fall precautions as indicated by assessment  - Record patient progress and toleration of activity level on Mobility SBAR; progress patient to next Phase/Stage  - Instruct patient to call for assistance with activity based on assessment  - Consider rehabilitation consult to assist with strengthening/weightbearing, etc   Outcome: Progressing     Problem: DISCHARGE PLANNING  Goal: Discharge to home or other facility with appropriate resources  Description  INTERVENTIONS:  - Identify barriers to discharge w/patient and caregiver  - Arrange for needed discharge resources and transportation as appropriate  - Identify discharge learning needs (meds, wound care, etc )  - Arrange for interpretive services to assist at discharge as needed  - Refer to Case Management Department for coordinating discharge planning if the patient needs post-hospital services based on physician/advanced practitioner order or complex needs related to functional status, cognitive ability, or social support system  Outcome: Progressing     Problem: Knowledge Deficit  Goal: Patient/family/caregiver demonstrates understanding of disease process, treatment plan, medications, and discharge instructions  Description  Complete learning assessment and assess knowledge base    Interventions:  - Provide teaching at level of understanding  - Provide teaching via preferred learning methods  Outcome: Progressing     Problem: CARDIOVASCULAR - ADULT  Goal: Maintains optimal cardiac output and hemodynamic stability  Description  INTERVENTIONS:  - Monitor I/O, vital signs and rhythm  - Monitor for S/S and trends of decreased cardiac output  - Administer and titrate ordered vasoactive medications to optimize hemodynamic stability  - Assess quality of pulses, skin color and temperature  - Assess for signs of decreased coronary artery perfusion  - Instruct patient to report change in severity of symptoms  Outcome: Progressing  Goal: Absence of cardiac dysrhythmias or at baseline rhythm  Description  INTERVENTIONS:  - Continuous cardiac monitoring, vital signs, obtain 12 lead EKG if ordered  - Administer antiarrhythmic and heart rate control medications as ordered  - Monitor electrolytes and administer replacement therapy as ordered  Outcome: Progressing

## 2020-01-10 NOTE — PROGRESS NOTES
Progress Note - Acute Pain Service    Sheela Velazquez 22 y o  female MRN: [id number]  Unit/Bed#: [facility] 723-01 Encounter: [id number]      Assessment:   Principal Problem:    Cervical lymphadenopathy, fever  Active Problems:    Myopericarditis    Elevated troponin    Neck pain    SIRS (systemic inflammatory response syndrome) (HCC)    Chest pain    Chlamydia      Plan:   Acute chest wall pain  Lymphadenopathy  Acute neck pain  · Tylenol 975 mg every 8 hours scheduled  · Discontinue Tylenol as needed  · Lidocaine patches daily, on for 12 hours and off for 12 hours  · Apply 1 patch to left chest wall and 1 patch to Neck  · Robaxin 750 mg every 6 hours scheduled  · Toradol oral 10 mg tablets every 6 hours as needed for breakthrough pain  · Pepcid twice a day for GI prophylaxis    Treatment recommendations discussed with primary care service  No further medication adjustments recommended at this time  [facility] sign off  Thank you for the Consult  Please contact [facility] ( [phone]) with any further questions    Pain History  Current pain location(s):  Neck, left chest wall  Pain Scale:   7-10  24 hour history:  Patient resting in bed, appears comfortable  Reporting ongoing pain in her neck and her left chest wall  Tolerating current analgesic regimen  Oral Toradol provides mild analgesic relief and she prefers this compared to naproxen  Stated overnight she had a migraine which lasted for several hours and improved after sleep  Also reporting increased vaginal discharge and irritation/itching        Meds/Allergies   all current active meds have been reviewed and current meds:   Current Facility-Administered Medications   Medication Dose Route Frequency    acetaminophen (TYLENOL) tablet 650 mg  650 mg Oral Q6H PRN    acetaminophen (TYLENOL) tablet 975 mg  975 mg Oral Q8H [address]    enoxaparin (LOVENOX) subcutaneous injection 40 mg  40 mg Subcutaneous Q24H [facility]    famotidine (PEPCID) tablet 20 mg  20 mg Oral BID  ketorolac (TORADOL) tablet 10 mg  10 mg Oral Q6H PRN    lidocaine (LIDODERM) 5 % patch 1 patch  1 patch Topical Daily    lidocaine (LIDODERM) 5 % patch 1 patch  1 patch Topical Daily    methocarbamol (ROBAXIN) tablet 750 mg  750 mg Oral Q6H [address]    ondansetron (ZOFRAN) injection 4 mg  4 mg Intravenous Q6H PRN       Allergies   Allergen Reactions    Latex Rash       Objective     Temp:  [98 2 °F (36 8 °C)-99 °F (37 2 °C)] 99 °F (37 2 °C)  BP: ()/(64-69) 98/64    Physical Exam   Constitutional: She is oriented to person, place, and time  No distress  HENT:   Head: Normocephalic and atraumatic  Eyes: EOM are normal    Neck: Normal range of motion  Pulmonary/Chest: Effort normal  No respiratory distress  Musculoskeletal: She exhibits no edema  Neurological: She is alert and oriented to person, place, and time  Skin: No rash noted  She is not diaphoretic  No pallor  Psychiatric: She has a normal mood and affect  Her behavior is normal    Vitals reviewed  Lab Results:   Results from last 7 days   Lab Units 01/08/20  0828   WBC Thousand/uL 11 61*   HEMOGLOBIN g/dL 12 0   HEMATOCRIT % 38 3   PLATELETS Thousands/uL 566*      Results from last 7 days   Lab Units 01/07/20  0751   POTASSIUM mmol/L 3 7   CHLORIDE mmol/L 109*   CO2 mmol/L 24   BUN mg/dL 3*   CREATININE mg/dL 0 58*   CALCIUM mg/dL 9 1   ALK PHOS U/L 98   ALT U/L 46   AST U/L 12       Counseling / Coordination of Care  Total floor / unit time spent today 15 minutes  Greater than 50% of total time was spent with the patient and / or family counseling and / or coordination of care   A description of the counseling / coordination of care:  Reviewed plan of care and medications with patient and treatment team     YANIRA Cunha  Acute Pain Service

## 2020-01-10 NOTE — ASSESSMENT & PLAN NOTE
· Pain musculoskeletal per cardiology  · No change with Naproxen   · Had relief with Toradol IV  · Toradol PO prn   · D/c Naproxen   · Ct Robaxin, scheduled Tylenol

## 2020-01-10 NOTE — PROGRESS NOTES
Progress Note - Marc Velazquez 2000, 22 y o  female MRN: [id number]    Unit/Bed#: [facility] 723-01 Encounter: [id number]    Primary Care Provider: YANIRA Manjarrez   Date and time admitted to hospital: 1/3/2020  9:54 PM        * Cervical lymphadenopathy, fever  Assessment & Plan  · Significant LAD on admission with fever  · CT neck  - Cervical lymphadenopathy   · Elevated ESR and CRP  · Mixed connective tissue disease in her mother  · EBV VCA IgM positive but EBNA positive - unusual for acute infection per ID  · CMV IgM, HIV screen negative  · Probable viral - rule out lymphoproliferative disorder/autoimmune disorder  · Seen by oncology - unlikely malignant - if not improved in 1 week can be investigated further  · Seen by rheumatology - if ERIK positive will be begun on Prednisone taper beginning at 40 mg           Chest pain  Assessment & Plan  · Pain musculoskeletal per cardiology  · No change with Naproxen   · Had relief with Toradol IV  · Toradol PO prn   · D/c Naproxen   · Ct Robaxin, scheduled Tylenol       SIRS (systemic inflammatory response syndrome) (HCC)  Assessment & Plan  · Tachycardia and febrile on admission   · Secondary to above  · Continue with above treatment    Chlamydia  Assessment & Plan  · Asymptomatic  · S/p Azithromycin 1 gm x 1    Neck pain  Assessment & Plan  · Pt reports to posterior neck pain  Suspected in setting of lymphadenopathy with significantly enlarged lymph nodes   · No osseous abnormality seen on CT  · Continue meds as above    Elevated troponin  Assessment & Plan  · 0 38/0 33/0 24  · In setting of viral illness   · Echo - normal  · No myocarditis per cardiology      VTE Pharmacologic Prophylaxis:   Pharmacologic: Enoxaparin (Lovenox)  Mechanical VTE Prophylaxis in Place: Yes    Patient Centered Rounds: I have performed bedside rounds with nursing staff today  Education and Discussions with Family / Patient: Discussed with patient   Declined family update    Time Spent for Care: 20 minutes  More than 50% of total time spent on counseling and coordination of care as described above  Current Length of Stay: 5 day(s)    Current Patient Status: Inpatient   Certification Statement: The patient will continue to require additional inpatient hospital stay due to Lymphadenopathy, fever undergoing evaluation    Code Status: Level 1 - Full Code    Subjective:   Fever improved  Left chest pain persisting    Objective:     Vitals:   Temp (24hrs), Av 2 °F (36 8 °C), Min:97 3 °F (36 3 °C), Max:99 °F (37 2 °C)    Temp:  [97 3 °F (36 3 °C)-99 °F (37 2 °C)] 98 2 °F (36 8 °C)  HR:  [72] 72  Resp:  [16-18] 18  BP: ()/(58-73) 109/67  SpO2:  [99 %] 99 %  Body mass index is 23 11 kg/m²  Physical Exam:     Physical Exam   Constitutional: She is oriented to person, place, and time  HENT:   Head: Normocephalic and atraumatic  Eyes: Pupils are equal, round, and reactive to light  EOM are normal    Neck: Normal range of motion  Neck supple  Cardiovascular: Normal rate and regular rhythm  Pulmonary/Chest: Effort normal and breath sounds normal    Abdominal: Soft  Bowel sounds are normal    Musculoskeletal: She exhibits no edema  Lymphadenopathy:     She has cervical adenopathy  Neurological: She is alert and oriented to person, place, and time  Skin: Skin is warm and dry  Psychiatric: She has a normal mood and affect         Additional Data:     Labs:    Results from last 7 days   Lab Units 20  0828   WBC Thousand/uL 11 61*   HEMOGLOBIN g/dL 12 0   HEMATOCRIT % 38 3   PLATELETS Thousands/uL 566*   NEUTROS PCT % 65   LYMPHS PCT % 25   MONOS PCT % 7   EOS PCT % 1     Results from last 7 days   Lab Units 20  0751   SODIUM mmol/L 139   POTASSIUM mmol/L 3 7   CHLORIDE mmol/L 109*   CO2 mmol/L 24   BUN mg/dL 3*   CREATININE mg/dL 0 58*   ANION GAP mmol/L 6   CALCIUM mg/dL 9 1   ALBUMIN g/dL 3 0*   TOTAL BILIRUBIN mg/dL 0 21   ALK PHOS U/L 98   ALT U/L 46   AST U/L 12   GLUCOSE RANDOM mg/dL 108       * I Have Reviewed All Lab Data Listed Above  * Additional Pertinent Lab Tests Reviewed: Kannan 66 Admission Reviewed      Recent Cultures (last 7 days):     Results from last 7 days   Lab Units 01/05/20  6542   BLOOD CULTURE  No Growth at 72 hrs  Last 24 Hours Medication List:     Current Facility-Administered Medications:  acetaminophen 650 mg Oral Q6H PRN Susanne Downing PA-C   acetaminophen 975 mg Oral [facility] Deedee Almanzar MD   enoxaparin 40 mg Subcutaneous Q24H [facility] HOME Rosemary Breaux DO   famotidine 20 mg Oral BID Deedee Almanzar MD   ketorolac 10 mg Oral Q6H PRN Deedee Almanzar MD   lidocaine 1 patch Topical Daily Deedee Almanzar MD   lidocaine 1 patch Topical Daily Deedee Almanzar MD   methocarbamol 750 mg Oral Q6H [facility] HOME Deedee Almanzar MD   ondansetron 4 mg Intravenous Q6H PRN Jericho Dominguez PA-C        Today, Patient Was Seen By: Deedee Almanzar MD    ** Please Note: Dictation voice to text software may have been used in the creation of this document   **

## 2020-01-10 NOTE — ASSESSMENT & PLAN NOTE
· Significant LAD on admission with fever  · CT neck  - Cervical lymphadenopathy   · Elevated ESR and CRP  · Mixed connective tissue disease in her mother  · EBV VCA IgM positive but EBNA positive - unusual for acute infection per ID  · CMV IgM, HIV screen negative  · Probable viral - rule out lymphoproliferative disorder/autoimmune disorder  · Seen by oncology - unlikely malignant - if not improved in 1 week can be investigated further  · Seen by rheumatology - if ERIK positive will be begun on Prednisone taper beginning at 40 mg

## 2020-01-10 NOTE — ASSESSMENT & PLAN NOTE
· Pt reports to posterior neck pain    Suspected in setting of lymphadenopathy with significantly enlarged lymph nodes   · No osseous abnormality seen on CT  · Continue meds as above

## 2020-01-10 NOTE — PROGRESS NOTES
Progress Note - Infectious Disease   Manuel Spine Velazquez 22 y o  female MRN: [id number]  Unit/Bed#: [facility] 723-01 Encounter: [id number]      Impression/Plan:  1  Fever and lymphadenopathy - possibly secondary to an acute viral process  EBV VCA IgM is positive, but EBNA is also positive, which is an unusual pattern for acute infection  Patient also does not have any atypical lymphocytes, LFT abnormalities, or pharyngitis, which are typically seen with acute mono syndrome  CMV IgM is negative  HIV screen and RNA negative  May consider the possibility of autoimmune or lymphoproliferative disorder  Anti-ds DNA is negative  - monitor off antibiotics   - follow-up RPR   - follow-up blood cultures   - follow-up ERIK   - may require lymph node biopsy if this syndrome does not resolve    2  Chlamydia - asymptomatic  Discussed with the patient the importance of having a partner treated  Patient treated with azithromycin x1   - follow-up RPR   - will repeat testing in 3 months    3  Chest pain - felt to be musculoskeletal in etiology  Less likely to be myopericarditis per Cardiology  - symptomatic treatment    Antibiotics:  None    Subjective:  Patient continues to report general malaise, fatigue, myalgias, chest wall pain, headaches, painful cervical lymph nodes, and intermittent hot flashes and chills overnight  She notes that she experienced nausea yesterday which improved with medication  She denies cough, SOB, vomiting, diarrhea, urinary symptoms, and rash  Objective:  Vitals:  Temp:  [98 2 °F (36 8 °C)-99 °F (37 2 °C)] 99 °F (37 2 °C)  BP: ()/(64-69) 98/64  Temp (24hrs), Av 6 °F (37 °C), Min:98 2 °F (36 8 °C), Max:99 °F (37 2 °C)  Current: Temperature: 99 °F (37 2 °C)    Physical Exam:   General Appearance:  Alert, interactive, nontoxic, no acute distress  Throat:  Neck: Oropharynx moist without lesions  Supple, FROM   Persistent lymphadenopathy of the anterior and posterior cervical, submandibular, and submental nodes with tenderness on palpation, unchanged from yesterday  Lungs:   Clear to auscultation bilaterally; no wheezes, rhonchi or rales; respirations unlabored   Heart:  RRR; no murmur, rub or gallop   Abdomen:   Soft, non-tender, non-distended, positive bowel sounds  Extremities: No clubbing, cyanosis or edema   Skin: No new rashes or lesions  No draining wounds noted  Labs, Imaging, & Other studies:   All pertinent labs and imaging studies were personally reviewed  Results from last 7 days   Lab Units 01/08/20  0828 01/07/20 0751 01/05/20  0515   WBC Thousand/uL 11 61* 12 01* 8 40   HEMOGLOBIN g/dL 12 0 11 8 12 1   PLATELETS Thousands/uL 566* 557* 496*     Results from last 7 days   Lab Units 01/07/20  0751 01/05/20  0515  01/04/20  0617   SODIUM mmol/L 139 140  --  137   POTASSIUM mmol/L 3 7 3 9  --  3 1*   CHLORIDE mmol/L 109* 110*  --  107   CO2 mmol/L 24 25  --  27   BUN mg/dL 3* 6  --  4*   CREATININE mg/dL 0 58* 0 51*  --  0 67   EGFR ml/min/1 73sq m 132 138  --  126   CALCIUM mg/dL 9 1 9 1  --  8 8   AST U/L 12 38  --   --    ALT U/L 46 74   < >  --    ALK PHOS U/L 98 115   < >  --     < > = values in this interval not displayed  Results from last 7 days   Lab Units 01/05/20  1752   BLOOD CULTURE  No Growth After 4 Days             Lulla Smoke, MS4  Infectious Disease

## 2020-01-11 VITALS
TEMPERATURE: 98.6 F | WEIGHT: 122.3 LBS | SYSTOLIC BLOOD PRESSURE: 114 MMHG | OXYGEN SATURATION: 99 % | HEART RATE: 82 BPM | DIASTOLIC BLOOD PRESSURE: 63 MMHG | RESPIRATION RATE: 20 BRPM | HEIGHT: 61 IN | BODY MASS INDEX: 23.09 KG/M2

## 2020-01-11 LAB — BACTERIA BLD CULT: NORMAL

## 2020-01-11 PROCEDURE — 99239 HOSP IP/OBS DSCHRG MGMT >30: CPT | Performed by: INTERNAL MEDICINE

## 2020-01-11 RX ORDER — METHOCARBAMOL 750 MG/1
750 TABLET, FILM COATED ORAL EVERY 6 HOURS SCHEDULED
Qty: 20 TABLET | Refills: 0 | Status: SHIPPED | OUTPATIENT
Start: 2020-01-11

## 2020-01-11 RX ORDER — ACETAMINOPHEN 325 MG/1
650 TABLET ORAL EVERY 6 HOURS PRN
Qty: 30 TABLET | Refills: 0 | Status: SHIPPED | OUTPATIENT
Start: 2020-01-11

## 2020-01-11 RX ORDER — LIDOCAINE 50 MG/G
1 PATCH TOPICAL DAILY
Qty: 15 PATCH | Refills: 0 | Status: SHIPPED | OUTPATIENT
Start: 2020-01-12

## 2020-01-11 RX ORDER — KETOROLAC TROMETHAMINE 10 MG/1
10 TABLET, FILM COATED ORAL EVERY 6 HOURS PRN
Qty: 4 TABLET | Refills: 0 | Status: SHIPPED | OUTPATIENT
Start: 2020-01-11 | End: 2021-06-07 | Stop reason: ALTCHOICE

## 2020-01-11 RX ADMIN — FAMOTIDINE 20 MG: 10 TABLET, FILM COATED ORAL at 08:39

## 2020-01-11 RX ADMIN — LIDOCAINE 1 PATCH: 50 PATCH TOPICAL at 08:36

## 2020-01-11 RX ADMIN — METHOCARBAMOL 750 MG: 750 TABLET, FILM COATED ORAL at 00:40

## 2020-01-11 RX ADMIN — METHOCARBAMOL 750 MG: 750 TABLET, FILM COATED ORAL at 06:07

## 2020-01-11 RX ADMIN — ACETAMINOPHEN 975 MG: 325 TABLET ORAL at 06:06

## 2020-01-11 RX ADMIN — LIDOCAINE 1 PATCH: 50 PATCH TOPICAL at 08:38

## 2020-01-11 RX ADMIN — KETOROLAC TROMETHAMINE 10 MG: 10 TABLET, FILM COATED ORAL at 00:40

## 2020-01-11 NOTE — PLAN OF CARE
Problem: PAIN - ADULT  Goal: Verbalizes/displays adequate comfort level or baseline comfort level  Description  Interventions:  - Encourage patient to monitor pain and request assistance  - Assess pain using appropriate pain scale  - Administer analgesics based on type and severity of pain and evaluate response  - Implement non-pharmacological measures as appropriate and evaluate response  - Consider cultural and social influences on pain and pain management  - Notify physician/advanced practitioner if interventions unsuccessful or patient reports new pain  1/11/2020 1316 by Meng Thayer RN  Outcome: Adequate for Discharge  1/11/2020 1229 by Meng Thayer RN  Outcome: Adequate for Discharge

## 2020-01-11 NOTE — ASSESSMENT & PLAN NOTE
· Pt reports posterior neck pain    Suspected in setting of lymphadenopathy with significantly enlarged lymph nodes   · No osseous abnormality seen on CT  · Continue meds as above

## 2020-01-11 NOTE — ASSESSMENT & PLAN NOTE
· Pain musculoskeletal per cardiology  · No change with Naproxen   · Had relief with Toradol IV  · Toradol PO prn   · Ct Robaxin, scheduled Tylenol

## 2020-01-11 NOTE — PROGRESS NOTES
Progress Note - Thony Velazquez 2000, 22 y o  female MRN: [id number]    Unit/Bed#: [facility] 723-01 Encounter: [id number]    Primary Care Provider: YANIRA Saucedo   Date and time admitted to hospital: 1/3/2020  9:54 PM        * Cervical lymphadenopathy, fever  Assessment & Plan  · Significant LAD on admission with fever  · CT neck  - Cervical lymphadenopathy   · Elevated ESR and CRP  · Mixed connective tissue disease in her mother  · EBV VCA IgM positive but EBNA positive - unusual for acute infection per ID  · CMV IgM, HIV screen negative  RPR negative  · Seen by oncology - unlikely malignant - if not improved in 1 week can be investigated further  · ERIK, dsDNA, complement,  Direct Coomb's, RF - negative  · Improved clinically  · Likely viral  · Await rheumatology review  · ID input appreciated           Chest pain  Assessment & Plan  · Pain musculoskeletal per cardiology  · No change with Naproxen   · Had relief with Toradol IV  · Toradol PO prn   · Ct Robaxin, scheduled Tylenol       Chlamydia  Assessment & Plan  · Asymptomatic  · S/p Azithromycin 1 gm x 1    Vaginal candidiasis  Assessment & Plan  · S/p fluconazole 200 mg x 1    Neck pain  Assessment & Plan  · Pt reports posterior neck pain  Suspected in setting of lymphadenopathy with significantly enlarged lymph nodes   · No osseous abnormality seen on CT  · Continue meds as above    Elevated troponin  Assessment & Plan  · 0 38/0 33/0 24  · In setting of viral illness   · Echo - normal  · No myocarditis per cardiology      VTE Pharmacologic Prophylaxis:   Pharmacologic: Enoxaparin (Lovenox)  Mechanical VTE Prophylaxis in Place: Yes    Patient Centered Rounds: I have performed bedside rounds with nursing staff today  Education and Discussions with Family / Patient:  Discussed with patient  Declined family update  Time Spent for Care: 20 minutes    More than 50% of total time spent on counseling and coordination of care as described above     Current Length of Stay: 6 day(s)    Current Patient Status: Inpatient   Certification Statement: The patient will continue to require additional inpatient hospital stay due to Fever, lymphadenopathy    Code Status: Level 1 - Full Code    Subjective:   Complains of itchy vaginal discharge with foul smell  T-max 99 6°  On scheduled Tylenol    Objective:     Vitals:   Temp (24hrs), Av 9 °F (37 2 °C), Min:98 2 °F (36 8 °C), Max:99 6 °F (37 6 °C)    Temp:  [98 2 °F (36 8 °C)-99 6 °F (37 6 °C)] 99 6 °F (37 6 °C)  HR:  [70-81] 81  BP: ()/(64-69) 106/68  SpO2:  [96 %] 96 %  Body mass index is 23 11 kg/m²  Physical Exam:     Physical Exam   Constitutional: She is oriented to person, place, and time  HENT:   Head: Normocephalic and atraumatic  Eyes: Pupils are equal, round, and reactive to light  EOM are normal    Neck: Normal range of motion  Neck supple  Cardiovascular: Normal rate and regular rhythm  Pulmonary/Chest: Effort normal and breath sounds normal    Abdominal: Soft  Bowel sounds are normal    Musculoskeletal: She exhibits no edema  Neurological: She is alert and oriented to person, place, and time  Skin: Skin is warm and dry  Psychiatric: She has a normal mood and affect  Additional Data:     Labs:    Results from last 7 days   Lab Units 20  0828   WBC Thousand/uL 11 61*   HEMOGLOBIN g/dL 12 0   HEMATOCRIT % 38 3   PLATELETS Thousands/uL 566*   NEUTROS PCT % 65   LYMPHS PCT % 25   MONOS PCT % 7   EOS PCT % 1     Results from last 7 days   Lab Units 20  0751   SODIUM mmol/L 139   POTASSIUM mmol/L 3 7   CHLORIDE mmol/L 109*   CO2 mmol/L 24   BUN mg/dL 3*   CREATININE mg/dL 0 58*   ANION GAP mmol/L 6   CALCIUM mg/dL 9 1   ALBUMIN g/dL 3 0*   TOTAL BILIRUBIN mg/dL 0 21   ALK PHOS U/L 98   ALT U/L 46   AST U/L 12   GLUCOSE RANDOM mg/dL 108       * I Have Reviewed All Lab Data Listed Above  * Additional Pertinent Lab Tests Reviewed:  Kannan Harding Admission Reviewed        Recent Cultures (last 7 days):     Results from last 7 days   Lab Units 01/05/20  8152   BLOOD CULTURE  No Growth After 4 Days  Last 24 Hours Medication List:     Current Facility-Administered Medications:  acetaminophen 650 mg Oral Q6H PRN Susanne Downing PA-C   acetaminophen 975 mg Oral [facility] Diane Torrez MD   enoxaparin 40 mg Subcutaneous Q24H [address] Constanza Brunson DO   famotidine 20 mg Oral BID Diane Torrez MD   ketorolac 10 mg Oral Q6H PRN Diane Torrez MD   lidocaine 1 patch Topical Daily Diane Torrez MD   lidocaine 1 patch Topical Daily Diaen Torrez MD   methocarbamol 750 mg Oral Q6H [address] Diane Torrez MD   ondansetron 4 mg Intravenous Q6H PRN Rita Soria PA-C        Today, Patient Was Seen By: Diane Torrez MD    ** Please Note: Dictation voice to text software may have been used in the creation of this document   **

## 2020-01-11 NOTE — ASSESSMENT & PLAN NOTE
· Significant LAD on admission with fever  · CT neck  - Cervical lymphadenopathy   · Elevated ESR and CRP  · Mixed connective tissue disease in her mother  · EBV VCA IgM positive but EBNA positive - unusual for acute infection per ID  · CMV IgM, HIV screen negative   RPR negative  · Seen by oncology - unlikely malignant - if not improved in 1 week can be investigated further  · ERIK, dsDNA, complement,  Direct Coomb's, RF - negative  · Improved clinically  · Likely viral  · Await rheumatology review  · ID input appreciated

## 2020-01-12 NOTE — PROGRESS NOTES
Progress Note - Sinai Velazquez 2000, 22 y o  female MRN: [id number]    Unit/Bed#: [facility] 723-01 Encounter: [id number]    Primary Care Provider: YANIRA Valdovinos   Date and time admitted to hospital: 1/3/2020  9:54 PM        * Cervical lymphadenopathy, fever  Assessment & Plan  · Significant LAD on admission with fever  · CT neck  - Cervical lymphadenopathy   · Elevated ESR and CRP  · Mixed connective tissue disease in her mother  · EBV VCA IgM positive but EBNA positive - unusual for acute infection per ID  · CMV IgM, HIV screen negative  RPR negative  · Seen by oncology - unlikely malignant - if not improved in 1 week can be investigated further  · ERIK, dsDNA, complement,  Direct Coomb's, RF - negative  · Improved clinically  · Likely viral  · Discharge home with outpatient rheumatology and hematology follow-up if lymphadenopathy and fever does not improve           Chest pain  Assessment & Plan  · Pain musculoskeletal per cardiology  · Improved with Robaxin, Lidoderm patch and Toradol as needed      Chlamydia  Assessment & Plan  · Asymptomatic  · S/p Azithromycin 1 gm x 1    Vaginal candidiasis  Assessment & Plan  · S/p fluconazole 200 mg x 1    Elevated troponin  Assessment & Plan  · 0 38/0 33/0 24  · In setting of viral illness   · Echo - normal  · No myocarditis per cardiology          Patient Centered Rounds: Discussed with RN    Education and Discussions with Family / Patient:  Discussed with patient  Declined family update    Time Spent for Care: 20 minutes  More than 50% of total time spent on counseling and coordination of care as described above  Current Length of Stay: 7 day(s)    Current Patient Status: Inpatient     Discharge Plan:  Discharge home today    Subjective:   Fever improved  Chest discomfort controlled       Objective:     Vitals:   Temp (24hrs), Av 7 °F (37 1 °C), Min:98 6 °F (37 °C), Max:98 8 °F (37 1 °C)    Temp:  [98 6 °F (37 °C)-98 8 °F (37 1 °C)] 98 6 °F (37 °C)  HR:  [82] 82  Resp:  [20] 20  BP: (111-114)/(63-69) 114/63  SpO2:  [99 %] 99 %  Body mass index is 23 11 kg/m²  Physical Exam:     Physical Exam   Constitutional: She is oriented to person, place, and time  HENT:   Head: Normocephalic and atraumatic  Eyes: Pupils are equal, round, and reactive to light  EOM are normal    Neck: Normal range of motion  Neck supple  Cardiovascular: Normal rate and regular rhythm  Pulmonary/Chest: Effort normal and breath sounds normal  No stridor  No respiratory distress  Abdominal: Soft  Bowel sounds are normal  She exhibits no distension  There is no tenderness  Musculoskeletal: She exhibits no edema  Lymphadenopathy:     She has cervical adenopathy  Neurological: She is alert and oriented to person, place, and time  Skin: Skin is warm and dry  Psychiatric: She has a normal mood and affect  Additional Data:     Labs:    Results from last 7 days   Lab Units 01/08/20  0828   WBC Thousand/uL 11 61*   HEMOGLOBIN g/dL 12 0   HEMATOCRIT % 38 3   PLATELETS Thousands/uL 566*   NEUTROS PCT % 65   LYMPHS PCT % 25   MONOS PCT % 7   EOS PCT % 1     Results from last 7 days   Lab Units 01/07/20  0751   SODIUM mmol/L 139   POTASSIUM mmol/L 3 7   CHLORIDE mmol/L 109*   CO2 mmol/L 24   BUN mg/dL 3*   CREATININE mg/dL 0 58*   ANION GAP mmol/L 6   CALCIUM mg/dL 9 1   ALBUMIN g/dL 3 0*   TOTAL BILIRUBIN mg/dL 0 21   ALK PHOS U/L 98   ALT U/L 46   AST U/L 12   GLUCOSE RANDOM mg/dL 108         * I Have Reviewed All Lab Data Listed Above  * Additional Pertinent Lab Tests Reviewed: Kannan 66 Admission Reviewed    Recent Cultures (last 7 days):     Results from last 7 days   Lab Units 01/05/20  1752   BLOOD CULTURE  No Growth After 5 Days  Today, Patient Was Seen By: Arleen Pulido MD    ** Please Note: Dictation voice to text software may have been used in the creation of this document   **

## 2020-01-12 NOTE — ASSESSMENT & PLAN NOTE
· Significant LAD on admission with fever  · CT neck  - Cervical lymphadenopathy   · Elevated ESR and CRP  · Mixed connective tissue disease in her mother  · EBV VCA IgM positive but EBNA positive - unusual for acute infection per ID  · CMV IgM, HIV screen negative   RPR negative  · Seen by oncology - unlikely malignant - if not improved in 1 week can be investigated further  · ERIK, dsDNA, complement,  Direct Coomb's, RF - negative  · Improved clinically  · Likely viral  · Discharge home with outpatient rheumatology and hematology follow-up if lymphadenopathy and fever does not improve

## 2020-01-12 NOTE — ASSESSMENT & PLAN NOTE
· Pain musculoskeletal per cardiology  · Improved with Robaxin, Lidoderm patch and Toradol as needed

## 2020-01-12 NOTE — DISCHARGE SUMMARY
Discharge Summary - [facility]    Patient Information: Jimmy Demarco 22 y o  female MRN: [id number]  Unit/Bed#: [facility] 723-01 Encounter: [id number]    Discharging Physician / Practitioner: Jolena Aase, MD  PCP: Philippe Saucedo  Admission Date: 1/3/2020  Discharge Date: 01/11/20    Principal discharge diagnoses:  1  Cervical lymphadenopathy and fever likely viral  2  Musculoskeletal chest pain  3  Chlamydia  4  Vaginal candidiasis  5  Non MI troponin elevation    Consultations During Hospital Stay:  Infectious disease  Cardiology  Hematology   Rheumatology  Acute pain  OBGYN    Procedures Performed:   1  Chest x-ray - no acute cardiopulmonary disease  2  CT soft tissue neck with contrast - Cervical lymphadenopathy with enlarged bilateral jugular chain, submandibular, and submental lymph nodes, nonspecific  These lymph nodes may be reactive however lymphoproliferative disorder is not excluded  Correlate clinically  No evidence of peritonsillar abscess or retropharyngeal edema  3  Echocardiogram -   LEFT VENTRICLE:  Systolic function was normal  Ejection fraction was estimated to be 65 %  There were no regional wall motion abnormalities  RIGHT VENTRICLE: The size was normal  Systolic function was normal    LEFT ATRIUM: Size was normal    RIGHT ATRIUM: Size was normal    MITRAL VALVE: Valve structure was normal  There was normal leaflet separation  DOPPLER: There was no evidence for stenosis  There was trace regurgitation    AORTIC VALVE: The valve was probably trileaflet  Leaflets exhibited normal thickness and normal cuspal separation  The valve was not well visualized  DOPPLER: There was no evidence for stenosis  There was no regurgitation    TRICUSPID VALVE: The valve structure was normal  There was normal leaflet separation  DOPPLER: There was no evidence for stenosis  There was trace regurgitation   Estimated peak PA pressure was 20 mmHg    PULMONIC VALVE: Leaflets exhibited normal thickness, no calcification, and normal cuspal separation  DOPPLER: The transpulmonic velocity was within the normal range  There was no regurgitation    PERICARDIUM: There was no pericardial effusion  The pericardium was normal in appearance    AORTA: The root exhibited normal size  Hospital Course:     Flaca Serna is a 22 y o  female patient who originally presented to the hospital on 1/3/2020 with chest pain, fever and cervical lymphadenopathy  She underwent an extensive workup in her symptoms were felt to be viral in origin  She was evaluated by Rheumatology and Hematology  Autoimmune workup was negative  Hematology recommends further investigation if her lymphadenopathy does not improve in a week  She was noted to have elevated troponin with initial concern for myocarditis and had been placed on colchicine  She was evaluated by Cardiology who determined there was no evidence of myocarditis and colchicine was discontinued  She tested positive for Chlamydia and was given a dose of azithromycin 1 g  She was also noted to have vaginal candidiasis and received fluconazole 200 mg x 1  Condition at Discharge: stable     Discharge Day Visit / Exam:     * Please refer to separate progress for these details *    Discharge instructions/Information to patient and family:   See after visit summary for information provided to patient and family  Provisions for Follow-Up Care:  See after visit summary for information related to follow-up care and any pertinent home health orders  Disposition: Home    Planned Readmission: No    Discharge Statement:  I spent 40 minutes discharging the patient  This time was spent on the day of discharge  I had direct contact with the patient on the day of discharge  Greater than 50% of the total time was spent examining patient, answering all patient questions, arranging and discussing plan of care with patient as well as directly providing post-discharge instructions  Additional time then spent on discharge activities  Discharge Medications:  See after visit summary for reconciled discharge medications provided to patient and family

## 2020-01-12 NOTE — UTILIZATION REVIEW
Notification of Discharge  This is a Notification of Discharge from our facility [address]  Please be advised that this patient has been discharge from our facility  Below you will find the admission and discharge date and time including the patients disposition  PRESENTATION DATE: 1/3/2020  9:54 PM  OBS ADMISSION DATE:   IP ADMISSION DATE: 1/4/20 1616   DISCHARGE DATE: 1/11/2020  1:45 PM  DISPOSITION: Home/Self Care Home/Self Care   Admission Orders listed below:  Admission Orders (From admission, onward)     Ordered        01/04/20 1616  Inpatient Admission  Once         01/04/20 0135  Place in Observation (expected length of stay for this patient is less than two midnights)  Once                   Please contact the UR Department if additional information is required to close this patient's authorization/case  [address] Utilization Review Department  Main: [phone] x carefully listen to the prompts  All voicemails are confidential   [email or web address]  Send all requests for admission clinical reviews, approved or denied determinations and any other requests to dedicated fax number below belonging to the campus where the patient is receiving treatment   List of dedicated fax numbers:  [address] [place] [address] DENIALS (Administrative/Medical Necessity) [phone]   [address]  (Maternity/NICU/Pediatrics) [phone]   Carlita Coffey [phone]   [facility] [phone]   Brandon Oneil [phone]   Heidy Reyna [facility] [address] [facility] [phone]   [facility]  [phone]   [address] [place] [address], [facility]  [address] [facility] [phone]

## 2021-06-07 ENCOUNTER — HOSPITAL ENCOUNTER (EMERGENCY)
Facility: HOSPITAL | Age: 21
Discharge: HOME | End: 2021-06-07
Attending: EMERGENCY MEDICINE
Payer: COMMERCIAL

## 2021-06-07 VITALS
HEART RATE: 63 BPM | DIASTOLIC BLOOD PRESSURE: 60 MMHG | OXYGEN SATURATION: 100 % | TEMPERATURE: 98.5 F | WEIGHT: 135 LBS | RESPIRATION RATE: 16 BRPM | BODY MASS INDEX: 25.51 KG/M2 | SYSTOLIC BLOOD PRESSURE: 117 MMHG

## 2021-06-07 DIAGNOSIS — F11.23 OPIATE WITHDRAWAL (HCC): Primary | ICD-10-CM

## 2021-06-07 LAB
AMPHETAMINES SERPL QL SCN: NEGATIVE
BARBITURATES UR QL: NEGATIVE
BENZODIAZ UR QL: NEGATIVE
COCAINE UR QL: NEGATIVE
ETHANOL EXG-MCNC: 0 MG/DL
EXT PREG TEST URINE: NEGATIVE
EXT. CONTROL ED NAV: NORMAL
METHADONE UR QL: NEGATIVE
OPIATES UR QL SCN: NEGATIVE
OXYCODONE+OXYMORPHONE UR QL SCN: NEGATIVE
PCP UR QL: NEGATIVE
THC UR QL: NEGATIVE

## 2021-06-07 PROCEDURE — 99284 EMERGENCY DEPT VISIT MOD MDM: CPT

## 2021-06-07 PROCEDURE — 82075 ASSAY OF BREATH ETHANOL: CPT | Performed by: EMERGENCY MEDICINE

## 2021-06-07 PROCEDURE — 80307 DRUG TEST PRSMV CHEM ANLYZR: CPT | Performed by: EMERGENCY MEDICINE

## 2021-06-07 PROCEDURE — 81025 URINE PREGNANCY TEST: CPT | Performed by: EMERGENCY MEDICINE

## 2021-06-07 PROCEDURE — 99284 EMERGENCY DEPT VISIT MOD MDM: CPT | Performed by: EMERGENCY MEDICINE

## 2021-06-07 RX ORDER — ONDANSETRON 4 MG/1
4 TABLET, ORALLY DISINTEGRATING ORAL ONCE
Status: COMPLETED | OUTPATIENT
Start: 2021-06-07 | End: 2021-06-07

## 2021-06-07 RX ORDER — ONDANSETRON 4 MG/1
4 TABLET, ORALLY DISINTEGRATING ORAL EVERY 6 HOURS PRN
Qty: 28 TABLET | Refills: 0 | Status: SHIPPED | OUTPATIENT
Start: 2021-06-07 | End: 2021-06-14

## 2021-06-07 RX ORDER — BUPRENORPHINE 2 MG/1
4 TABLET SUBLINGUAL ONCE
Status: DISCONTINUED | OUTPATIENT
Start: 2021-06-07 | End: 2021-06-07 | Stop reason: CLARIF

## 2021-06-07 RX ORDER — BUPRENORPHINE 2 MG/1
2 TABLET SUBLINGUAL DAILY
Status: DISCONTINUED | OUTPATIENT
Start: 2021-06-07 | End: 2021-06-07

## 2021-06-07 RX ORDER — BUPRENORPHINE AND NALOXONE 2; .5 MG/1; MG/1
4 FILM, SOLUBLE BUCCAL; SUBLINGUAL ONCE
Status: COMPLETED | OUTPATIENT
Start: 2021-06-07 | End: 2021-06-07

## 2021-06-07 RX ORDER — CLONIDINE HYDROCHLORIDE 0.2 MG/1
0.2 TABLET ORAL ONCE
Status: COMPLETED | OUTPATIENT
Start: 2021-06-07 | End: 2021-06-07

## 2021-06-07 RX ADMIN — BUPRENORPHINE AND NALOXONE 4 MG: 2; .5 FILM BUCCAL; SUBLINGUAL at 10:07

## 2021-06-07 RX ADMIN — ONDANSETRON 4 MG: 4 TABLET, ORALLY DISINTEGRATING ORAL at 08:28

## 2021-06-07 RX ADMIN — CLONIDINE HYDROCHLORIDE 0.2 MG: 0.2 TABLET ORAL at 08:28

## 2021-06-07 NOTE — ED ATTENDING ATTESTATION
6/7/2021  Prasanth CHAIREZ DO, saw and evaluated the patient  I have discussed the patient with the resident/non-physician practitioner and agree with the resident's/non-physician practitioner's findings, Plan of Care, and MDM as documented in the resident's/non-physician practitioner's note, except where noted  All available labs and Radiology studies were reviewed  I was present for key portions of any procedure(s) performed by the resident/non-physician practitioner and I was immediately available to provide assistance  At this point I agree with the current assessment done in the Emergency Department  I have conducted an independent evaluation of this patient a history and physical is as follows:    22-year-old female, says that for the last 4 months she has been abusing Percocets, met with that outpatient drug treatment program about a week ago, they reportedly prescribed Suboxone but told her to not start the Suboxone until at least 24-48 hours had elapsed from her last dose of Percocet to avoid precipitated withdrawal   She says that she has been unable to go that long without using Percocet because of withdrawal symptoms developing  She says that she thinks that the last time she used Percocet it may have also had fentanyl cut seemed little bit stronger  She has been having some yawning, feeling a little more malaise, some nausea, some occasional loose stools  She says she would like help with her symptoms until she can start Suboxone and follow-up with her outpatient clinic  Denies any thoughts of harming herself       General:  Patient is well-appearing  Head:  Atraumatic  Eyes:  Conjunctiva pink  ENT:  Mucous membranes are moist  Neck:  Supple  Cardiac:  S1-S2, without murmurs  Lungs:  Clear to auscultation bilaterally  Abdomen:  Soft, nontender, normal bowel sounds, no CVA tenderness, no tympany, no rigidity, no guarding  Extremities:  Normal range of motion  Neurologic:  Awake, fluent speech, normal comprehension  AAOx3  Skin:  Pink warm and dry  Psychiatric:  Alert, pleasant, cooperative            ED Course     Patient will be treated symptomatically, encouraged to follow up with her Suboxone clinic  Supportive care, importance of follow-up and return precautions were discussed with the patient, who expressed understanding        Critical Care Time  Procedures

## 2021-06-07 NOTE — DISCHARGE INSTRUCTIONS
Continue Zofran as needed, oral hydration, previously prescribed buprenorphine  Return to emergency department if symptoms worsen and if you are unable to tolerate oral fluids

## 2021-06-07 NOTE — Clinical Note
Yobani Cronin was seen and treated in our emergency department on 6/7/2021  Diagnosis: opiate withdrawal    Kristin    She may return on this date: If you have any questions or concerns, please don't hesitate to call        You Gould MD    ______________________________           _______________          _______________  Hospital Representative                              Date                                Time

## 2021-06-07 NOTE — ED PROVIDER NOTES
History  Chief Complaint   Patient presents with    Withdrawal - Drug     Patient states that she is having withdrawal symptoms from Fentanyl  States that she last used 12 hrs ago  HPI  Patient is a 57-year-old female presenting for evaluation symptoms of opiate withdrawal   Patient states that she has been abusing Percocets for the last 4 months  Patient states that earlier in the day she used what she believed to be Percocet however states that she felt different this time, had outpatient drug testing which was positive for fentanyl leading her to believe that it was adulterated  Patient stating now that for the last 12 hours she has been feeling symptoms of body aches, chills, goose bumps, loose bowel movements, nausea, vomiting  Patient denies recent travel, recent sick contacts, cough, shortness of breath, sore  Patient met with outpatient drug treatment program about a week ago, was prescribed Suboxone but states that she has not taken it during this instance because she was told not to self administer until 24-48 hours from last dose Percocet  Prior to Admission Medications   Prescriptions Last Dose Informant Patient Reported?  Taking?   acetaminophen (TYLENOL) 325 mg tablet Not Taking at Unknown time  No No   Sig: Take 2 tablets (650 mg total) by mouth every 6 (six) hours as needed for mild pain, headaches or fever   Patient not taking: Reported on 6/7/2021   lidocaine (LIDODERM) 5 % Not Taking at Unknown time  No No   Sig: Apply 1 patch topically daily Remove & Discard patch within 12 hours   Patient not taking: Reported on 6/7/2021   methocarbamol (ROBAXIN) 750 mg tablet Not Taking at Unknown time  No No   Sig: Take 1 tablet (750 mg total) by mouth every 6 (six) hours   Patient not taking: Reported on 6/7/2021   ondansetron (ZOFRAN) 4 mg tablet Not Taking at Unknown time  Yes No   Sig: Take 4 mg by mouth every 6 (six) hours as needed for nausea or vomiting      Facility-Administered Medications: None       Past Medical History:   Diagnosis Date    Asthma     Thrombocytosis ([facility] [address] )        Past Surgical History:   Procedure Laterality Date    APPENDECTOMY         Family History   Problem Relation Age of Onset    No Known Problems Mother      I have reviewed and agree with the history as documented  E-Cigarette/Vaping     E-Cigarette/Vaping Substances     Social History     Tobacco Use    Smoking status: Never Smoker    Smokeless tobacco: Never Used   Substance Use Topics    Alcohol use: Yes     Frequency: Monthly or less     Drinks per session: 1 or 2    Drug use: Yes     Types: Fentanyl        Review of Systems   Constitutional: Positive for fever  Negative for chills and fatigue  HENT: Negative for sore throat  Eyes: Negative for visual disturbance  Respiratory: Negative for cough, chest tightness and shortness of breath  Cardiovascular: Negative for chest pain  Gastrointestinal: Positive for diarrhea, nausea and vomiting  Negative for abdominal distention, abdominal pain and constipation  Endocrine: Negative for polydipsia and polyuria  Genitourinary: Negative for dysuria and hematuria  Musculoskeletal: Positive for myalgias  Negative for arthralgias  Skin: Negative for color change and rash  Neurological: Negative for dizziness and headaches  Psychiatric/Behavioral: Negative for confusion  All other systems reviewed and are negative  Physical Exam  ED Triage Vitals [06/07/21 0748]   Temperature Pulse Respirations Blood Pressure SpO2   98 5 °F (36 9 °C) 63 16 117/60 100 %      Temp Source Heart Rate Source Patient Position - Orthostatic VS BP Location FiO2 (%)   Oral Monitor Lying Right arm --      Pain Score       Worst Possible Pain             Orthostatic Vital Signs  Vitals:    06/07/21 0748   BP: 117/60   Pulse: 63   Patient Position - Orthostatic VS: Lying       Physical Exam  Vitals signs reviewed     Constitutional:       General: She is not in acute distress  Appearance: She is well-developed  She is not diaphoretic  Comments: Well-appearing, nondistressed   HENT:      Head: Normocephalic and atraumatic  Right Ear: External ear normal       Left Ear: External ear normal       Nose: Nose normal       Mouth/Throat:      Pharynx: No oropharyngeal exudate  Eyes:      Pupils: Pupils are equal, round, and reactive to light  Neck:      Musculoskeletal: Normal range of motion  Cardiovascular:      Rate and Rhythm: Normal rate and regular rhythm  Heart sounds: Normal heart sounds  No murmur  No friction rub  No gallop  Pulmonary:      Effort: Pulmonary effort is normal  No respiratory distress  Breath sounds: Normal breath sounds  No wheezing  Chest:      Chest wall: No tenderness  Abdominal:      General: Bowel sounds are normal  There is no distension  Palpations: Abdomen is soft  There is no mass  Tenderness: There is no abdominal tenderness  There is no guarding  Comments: Abdomen soft, nontender, nondistended   Musculoskeletal: Normal range of motion  General: No deformity  Lymphadenopathy:      Cervical: No cervical adenopathy  Skin:     General: Skin is warm and dry  Capillary Refill: Capillary refill takes less than 2 seconds  Neurological:      Mental Status: She is alert and oriented to person, place, and time        Comments: AAO x3         ED Medications  Medications   cloNIDine (CATAPRES) tablet 0 2 mg (0 2 mg Oral Given 6/7/21 0828)   ondansetron (ZOFRAN-ODT) dispersible tablet 4 mg (4 mg Oral Given 6/7/21 0828)   buprenorphine-naloxone (Suboxone) film 4 mg (4 mg Sublingual Given 6/7/21 1007)       Diagnostic Studies  Results Reviewed     Procedure Component Value Units Date/Time    Rapid drug screen, urine [[id number]]  (Normal) Collected: 06/07/21 0833    Lab Status: Final result Specimen: Urine, Clean Catch Updated: 06/07/21 0919     Amph/Meth UR Negative     Barbiturate Ur Negative Benzodiazepine Urine Negative     Cocaine Urine Negative     Methadone Urine Negative     Opiate Urine Negative     PCP Ur Negative     THC Urine Negative     Oxycodone Urine Negative    Narrative:      FOR MEDICAL PURPOSES ONLY  IF CONFIRMATION NEEDED PLEASE CONTACT THE LAB WITHIN 5 DAYS      Drug Screen Cutoff Levels:  AMPHETAMINE/METHAMPHETAMINES  1000 ng/mL  BARBITURATES     200 ng/mL  BENZODIAZEPINES     200 ng/mL  COCAINE      300 ng/mL  METHADONE      300 ng/mL  OPIATES      300 ng/mL  PHENCYCLIDINE     25 ng/mL  THC       50 ng/mL  OXYCODONE      100 ng/mL    POCT pregnancy, urine [[id number]]  (Normal) Resulted: 06/07/21 0837    Lab Status: Final result Updated: 06/07/21 0837     EXT PREG TEST UR (Ref: Negative) Negative     Control Valid    POCT alcohol breath test [[id number]]  (Normal) Resulted: 06/07/21 0830    Lab Status: Final result Updated: 06/07/21 0830     EXTBreath Alcohol 0 000                 No orders to display         Procedures  Procedures      ED Course                                       MDM  Number of Diagnoses or Management Options  Opiate withdrawal [facility]):   Diagnosis management comments: 57-year-old female with symptoms of opiate withdrawal, well-appearing with normal vital signs, provided with clonidine, PDMP review performed and provided with home dose of Suboxone, patient stating minimal symptomatic relief, offered host evaluation, initially desiring but changes her mind over the course of ED stay and ultimately discharged with return precautions and instructions to continue with home Suboxone    Patient Progress  Patient progress: improved      Disposition  Final diagnoses:   Opiate withdrawal ([address] )     Time reflects when diagnosis was documented in both MDM as applicable and the Disposition within this note     Time User Action Codes Description Comment    6/7/2021  8:46 AM Armani Zuniga Add [F11 23] Opiate withdrawal [facility])       ED Disposition     ED Disposition Condition Date/Time Comment    Discharge Stable Mon Jun 7, 2021  8:46 AM Charbel Velazquez discharge to home/self care  Follow-up Information     Follow up With Specialties Details Why Contact Info    Philippe Nolasco  Nurse Practitioner   [facility]oi  [address]  [phone]            Discharge Medication List as of 6/7/2021  8:49 AM      START taking these medications    Details   ondansetron (ZOFRAN-ODT) 4 mg disintegrating tablet Take 1 tablet (4 mg total) by mouth every 6 (six) hours as needed for nausea or vomiting for up to 7 days, Starting Mon 6/7/2021, Until Mon 6/14/2021, Print         CONTINUE these medications which have NOT CHANGED    Details   acetaminophen (TYLENOL) 325 mg tablet Take 2 tablets (650 mg total) by mouth every 6 (six) hours as needed for mild pain, headaches or fever, Starting Sat 1/11/2020, Normal      lidocaine (LIDODERM) 5 % Apply 1 patch topically daily Remove & Discard patch within 12 hours, Starting Sun 1/12/2020, Normal      methocarbamol (ROBAXIN) 750 mg tablet Take 1 tablet (750 mg total) by mouth every 6 (six) hours, Starting Sat 1/11/2020, Normal      ondansetron (ZOFRAN) 4 mg tablet Take 4 mg by mouth every 6 (six) hours as needed for nausea or vomiting, Historical Med           No discharge procedures on file  PDMP Review     None           ED Provider  Attending physically available and evaluated Tre Cadena  CONTRERAS managed the patient along with the ED Attending      Electronically Signed by         Julius Lees MD  06/08/21 7686